# Patient Record
Sex: FEMALE | Race: WHITE | NOT HISPANIC OR LATINO | Employment: OTHER | URBAN - METROPOLITAN AREA
[De-identification: names, ages, dates, MRNs, and addresses within clinical notes are randomized per-mention and may not be internally consistent; named-entity substitution may affect disease eponyms.]

---

## 2017-12-29 ENCOUNTER — HOSPITAL ENCOUNTER (INPATIENT)
Facility: OTHER | Age: 82
LOS: 7 days | Discharge: NURSING, REHAB OR LONG-TERM CARE | DRG: 194 | End: 2018-01-05
Attending: EMERGENCY MEDICINE | Admitting: HOSPITALIST
Payer: MEDICARE

## 2017-12-29 DIAGNOSIS — I10 ESSENTIAL HYPERTENSION: ICD-10-CM

## 2017-12-29 DIAGNOSIS — D53.9 ANEMIA, MACROCYTIC: ICD-10-CM

## 2017-12-29 DIAGNOSIS — N30.00 ACUTE CYSTITIS WITHOUT HEMATURIA: ICD-10-CM

## 2017-12-29 DIAGNOSIS — R06.02 SHORTNESS OF BREATH: ICD-10-CM

## 2017-12-29 DIAGNOSIS — J18.9 COMMUNITY ACQUIRED PNEUMONIA: ICD-10-CM

## 2017-12-29 DIAGNOSIS — J18.9 COMMUNITY ACQUIRED PNEUMONIA, UNSPECIFIED LATERALITY: Primary | ICD-10-CM

## 2017-12-29 DIAGNOSIS — J45.909 REACTIVE AIRWAY DISEASE WITHOUT COMPLICATION, UNSPECIFIED ASTHMA SEVERITY, UNSPECIFIED WHETHER PERSISTENT: ICD-10-CM

## 2017-12-29 DIAGNOSIS — E87.1 HYPONATREMIA: ICD-10-CM

## 2017-12-29 PROBLEM — D69.6 THROMBOCYTOPENIA: Status: ACTIVE | Noted: 2017-12-29

## 2017-12-29 PROBLEM — D64.9 ANEMIA: Status: ACTIVE | Noted: 2017-12-29

## 2017-12-29 LAB
ANION GAP SERPL CALC-SCNC: 14 MMOL/L
BASOPHILS # BLD AUTO: 0.01 K/UL
BASOPHILS NFR BLD: 0.1 %
BNP SERPL-MCNC: 100 PG/ML
BUN SERPL-MCNC: 15 MG/DL
CALCIUM SERPL-MCNC: 8.8 MG/DL
CHLORIDE SERPL-SCNC: 95 MMOL/L
CO2 SERPL-SCNC: 19 MMOL/L
CREAT SERPL-MCNC: 0.8 MG/DL
DIFFERENTIAL METHOD: ABNORMAL
EOSINOPHIL # BLD AUTO: 0 K/UL
EOSINOPHIL NFR BLD: 0 %
ERYTHROCYTE [DISTWIDTH] IN BLOOD BY AUTOMATED COUNT: 15.6 %
EST. GFR  (AFRICAN AMERICAN): >60 ML/MIN/1.73 M^2
EST. GFR  (NON AFRICAN AMERICAN): >60 ML/MIN/1.73 M^2
GLUCOSE SERPL-MCNC: 122 MG/DL
HCT VFR BLD AUTO: 36.4 %
HGB BLD-MCNC: 12 G/DL
LYMPHOCYTES # BLD AUTO: 1.5 K/UL
LYMPHOCYTES NFR BLD: 9.1 %
MCH RBC QN AUTO: 33.1 PG
MCHC RBC AUTO-ENTMCNC: 33 G/DL
MCV RBC AUTO: 100 FL
MONOCYTES # BLD AUTO: 0.8 K/UL
MONOCYTES NFR BLD: 4.8 %
NEUTROPHILS # BLD AUTO: 13.6 K/UL
NEUTROPHILS NFR BLD: 85.2 %
PLATELET # BLD AUTO: 118 K/UL
PMV BLD AUTO: 10.5 FL
POTASSIUM SERPL-SCNC: 3.6 MMOL/L
RBC # BLD AUTO: 3.63 M/UL
SODIUM SERPL-SCNC: 128 MMOL/L
TROPONIN I SERPL DL<=0.01 NG/ML-MCNC: <0.006 NG/ML
WBC # BLD AUTO: 15.94 K/UL

## 2017-12-29 PROCEDURE — 25000003 PHARM REV CODE 250: Performed by: EMERGENCY MEDICINE

## 2017-12-29 PROCEDURE — 96365 THER/PROPH/DIAG IV INF INIT: CPT

## 2017-12-29 PROCEDURE — 25000242 PHARM REV CODE 250 ALT 637 W/ HCPCS: Performed by: PHYSICIAN ASSISTANT

## 2017-12-29 PROCEDURE — 94761 N-INVAS EAR/PLS OXIMETRY MLT: CPT

## 2017-12-29 PROCEDURE — 94640 AIRWAY INHALATION TREATMENT: CPT

## 2017-12-29 PROCEDURE — 99223 1ST HOSP IP/OBS HIGH 75: CPT | Mod: ,,, | Performed by: PHYSICIAN ASSISTANT

## 2017-12-29 PROCEDURE — 63600175 PHARM REV CODE 636 W HCPCS: Performed by: PHYSICIAN ASSISTANT

## 2017-12-29 PROCEDURE — 94644 CONT INHLJ TX 1ST HOUR: CPT

## 2017-12-29 PROCEDURE — 93010 ELECTROCARDIOGRAM REPORT: CPT | Mod: ,,, | Performed by: INTERNAL MEDICINE

## 2017-12-29 PROCEDURE — 99284 EMERGENCY DEPT VISIT MOD MDM: CPT | Mod: 25

## 2017-12-29 PROCEDURE — 25000242 PHARM REV CODE 250 ALT 637 W/ HCPCS: Performed by: EMERGENCY MEDICINE

## 2017-12-29 PROCEDURE — 85025 COMPLETE CBC W/AUTO DIFF WBC: CPT

## 2017-12-29 PROCEDURE — 93005 ELECTROCARDIOGRAM TRACING: CPT

## 2017-12-29 PROCEDURE — 87040 BLOOD CULTURE FOR BACTERIA: CPT

## 2017-12-29 PROCEDURE — 11000001 HC ACUTE MED/SURG PRIVATE ROOM

## 2017-12-29 PROCEDURE — 25000003 PHARM REV CODE 250: Performed by: PHYSICIAN ASSISTANT

## 2017-12-29 PROCEDURE — 80048 BASIC METABOLIC PNL TOTAL CA: CPT

## 2017-12-29 PROCEDURE — 99900035 HC TECH TIME PER 15 MIN (STAT)

## 2017-12-29 PROCEDURE — 83880 ASSAY OF NATRIURETIC PEPTIDE: CPT

## 2017-12-29 PROCEDURE — 84484 ASSAY OF TROPONIN QUANT: CPT

## 2017-12-29 PROCEDURE — 63600175 PHARM REV CODE 636 W HCPCS: Performed by: EMERGENCY MEDICINE

## 2017-12-29 PROCEDURE — 96375 TX/PRO/DX INJ NEW DRUG ADDON: CPT

## 2017-12-29 RX ORDER — HEPARIN SODIUM 5000 [USP'U]/ML
5000 INJECTION, SOLUTION INTRAVENOUS; SUBCUTANEOUS EVERY 8 HOURS
Status: DISCONTINUED | OUTPATIENT
Start: 2017-12-29 | End: 2017-12-30

## 2017-12-29 RX ORDER — ALBUTEROL SULFATE 0.83 MG/ML
15 SOLUTION RESPIRATORY (INHALATION)
Status: COMPLETED | OUTPATIENT
Start: 2017-12-29 | End: 2017-12-29

## 2017-12-29 RX ORDER — AMOXICILLIN 250 MG
1 CAPSULE ORAL 2 TIMES DAILY PRN
Status: DISCONTINUED | OUTPATIENT
Start: 2017-12-29 | End: 2018-01-05 | Stop reason: HOSPADM

## 2017-12-29 RX ORDER — ACETAMINOPHEN 325 MG/1
650 TABLET ORAL EVERY 8 HOURS PRN
Status: DISCONTINUED | OUTPATIENT
Start: 2017-12-29 | End: 2018-01-05 | Stop reason: HOSPADM

## 2017-12-29 RX ORDER — SODIUM CHLORIDE 9 MG/ML
INJECTION, SOLUTION INTRAVENOUS CONTINUOUS
Status: DISCONTINUED | OUTPATIENT
Start: 2017-12-30 | End: 2017-12-30

## 2017-12-29 RX ORDER — SODIUM CHLORIDE 0.9 % (FLUSH) 0.9 %
5 SYRINGE (ML) INJECTION
Status: DISCONTINUED | OUTPATIENT
Start: 2017-12-29 | End: 2018-01-05 | Stop reason: HOSPADM

## 2017-12-29 RX ORDER — DIPHENHYDRAMINE HCL 25 MG
25 CAPSULE ORAL EVERY 6 HOURS PRN
Status: DISCONTINUED | OUTPATIENT
Start: 2017-12-29 | End: 2018-01-05 | Stop reason: HOSPADM

## 2017-12-29 RX ORDER — CEFTRIAXONE 1 G/1
1 INJECTION, POWDER, FOR SOLUTION INTRAMUSCULAR; INTRAVENOUS
Status: COMPLETED | OUTPATIENT
Start: 2017-12-29 | End: 2017-12-29

## 2017-12-29 RX ORDER — ONDANSETRON 2 MG/ML
4 INJECTION INTRAMUSCULAR; INTRAVENOUS EVERY 8 HOURS PRN
Status: DISCONTINUED | OUTPATIENT
Start: 2017-12-29 | End: 2018-01-05 | Stop reason: HOSPADM

## 2017-12-29 RX ORDER — IPRATROPIUM BROMIDE AND ALBUTEROL SULFATE 2.5; .5 MG/3ML; MG/3ML
3 SOLUTION RESPIRATORY (INHALATION)
Status: DISCONTINUED | OUTPATIENT
Start: 2017-12-29 | End: 2018-01-01

## 2017-12-29 RX ADMIN — HEPARIN SODIUM 5000 UNITS: 5000 INJECTION, SOLUTION INTRAVENOUS; SUBCUTANEOUS at 10:12

## 2017-12-29 RX ADMIN — ACETAMINOPHEN 650 MG: 325 TABLET ORAL at 10:12

## 2017-12-29 RX ADMIN — ALBUTEROL SULFATE 15 MG: 2.5 SOLUTION RESPIRATORY (INHALATION) at 11:12

## 2017-12-29 RX ADMIN — DIPHENHYDRAMINE HYDROCHLORIDE 25 MG: 25 CAPSULE ORAL at 10:12

## 2017-12-29 RX ADMIN — AZITHROMYCIN MONOHYDRATE 500 MG: 500 INJECTION, POWDER, LYOPHILIZED, FOR SOLUTION INTRAVENOUS at 11:12

## 2017-12-29 RX ADMIN — IPRATROPIUM BROMIDE AND ALBUTEROL SULFATE 3 ML: .5; 3 SOLUTION RESPIRATORY (INHALATION) at 07:12

## 2017-12-29 RX ADMIN — CEFTRIAXONE SODIUM 1 G: 1 INJECTION, POWDER, FOR SOLUTION INTRAMUSCULAR; INTRAVENOUS at 11:12

## 2017-12-29 RX ADMIN — IPRATROPIUM BROMIDE AND ALBUTEROL SULFATE 3 ML: .5; 3 SOLUTION RESPIRATORY (INHALATION) at 02:12

## 2017-12-29 NOTE — PROGRESS NOTES
Started an hour long nebulizer treatment in ED due to SOB;Pt tolerated it well. Pt was admitted for observation and treatment. Q6 Duoneb treatments were ordered and started. Pt tolerated it well. Will continue to monitor.

## 2017-12-29 NOTE — NURSING
Pt assisted to bathroom by family.  Pt now c/o SOB.  SpO2 95% on room air.  Pt still coarse and wheezing.  Blanche RRT went to bedside.  CARLOS Mcfadden at bedside now with family.  Pt resting comfortably in bed, she has no complaints at this time.  Bedpan brought to pt's room and family educated to use it for now as patient is weak, they verbalized understanding.  Will continue to monitor.

## 2017-12-29 NOTE — ED NOTES
Patient identifiers verified and correct for Radha Connell.    LOC: The patient is awake, alert and aware of environment with an appropriate affect, the patient is oriented x 3 and speaking appropriately.  APPEARANCE: Patient resting comfortably and in no acute distress, patient is clean and well groomed, patient's clothing is properly fastened.  SKIN: The skin is warm and dry, color consistent with ethnicity, patient has normal skin turgor and moist mucus membranes, skin intact, no breakdown or bruising noted.  MUSCULOSKELETAL: Patient moving all extremities spontaneously, no obvious swelling or deformities noted.  RESPIRATORY: Airway is open and patent, respirations are spontaneous, patient has a normal effort and rate, no accessory muscle use noted, bilateral breath sounds expiratory wheezes and coarse, +cough  CARDIAC: Patient has a normal rate and regular rhythm, no periphreal edema noted, capillary refill < 3 seconds.  ABDOMEN: Soft and non tender to palpation, no distention noted.  NEUROLOGIC: Eyes open spontaneously, behavior appropriate to situation, follows commands, facial expression symmetrical, bilateral hand grasp equal and even, purposeful motor response noted, normal sensation in all extremities when touched with a finger.

## 2017-12-29 NOTE — PLAN OF CARE
Problem: Patient Care Overview  Goal: Plan of Care Review  Outcome: Ongoing (interventions implemented as appropriate)  Patient free of fall or injury since arrival to floor.  Denies pain.  SOB controlled with respiratory treatments; still moderately coarse but wheezing has improved.  Positions self independently in bed without difficulty.  VSS on RA.  Family states they will remain at bedside 24/7 and they assist patient with voiding, eating, etc.  Plan of care reviewed extensively with patient and family, all questions answered.  Patient now resting comfortably in bed, has no complaints at this time.  Will continue to monitor.

## 2017-12-29 NOTE — ED NOTES
Pt to ED c/o SOB and cough since last night, no hx of lung dz. EMS reports initial sat of 88% on RA, increased to 98% with DUONEB. Pt reports increased ease of breathing with treatment. Pt denies pain, CP, NVD, fever, chills, congestion. Pt AAOx4 and appropriate at this time. Respirations mildly tachypnic, no distress noted. Awaiting further orders. Pt updated on POC. Bed is locked and in lowest position with side rails up x2. Call bell within reach and pt oriented to use of call bell. Pt on continuous cardiac monitoring, continuous pulse ox, and continuous BP cuff. Allergy and fall risk bands applied. Will continue to monitor.

## 2017-12-29 NOTE — ED NOTES
EMS nebulizer complete, mask removed, Pt O2 sats remains between 95-97% on RA. Wheezes heard in all lung fields.

## 2017-12-29 NOTE — PLAN OF CARE
CM met with pt and family at bedside for initial discharge planning assessment. Pt and family are visiting from [place] for [place]. Pt lives in an assisted living facility in [place] and will return there at time of discharge. Family states likely no CM needs for discharge.     12/29/17 1527   Discharge Assessment   Assessment Type Discharge Planning Assessment   Confirmed/corrected address and phone number on facesheet? Yes   Assessment information obtained from? Caregiver;Medical Record;Patient   Expected Length of Stay (days) 2   Communicated expected length of stay with patient/caregiver yes   Prior to hospitilization cognitive status: Alert/Oriented   Prior to hospitalization functional status: Needs Assistance   Current cognitive status: Alert/Oriented   Current Functional Status: Needs Assistance   Facility Arrived From: home   Lives With other (see comments)  (assis[facility] living facility)   Able to Return to Prior Arrangements yes   Is patient able to care for self after discharge? (pt lives in assissted living facility)   Who are your caregiver(s) and their phone number(s)? Luz [address], daughter,    Patient's perception of discharge disposition assisted living   Readmission Within The Last 30 Days no previous admission in last 30 days   Patient currently being followed by outpatient case management? No   Patient currently receives any other outside agency services? No   Equipment Currently Used at Home walker, rolling   Do you have any problems affording any of your prescribed medications? No   Is the patient taking medications as prescribed? yes   Does the patient have transportation home? Yes   Transportation Available family or friend will provide   Does the patient receive services at the [facility]? No   Discharge Plan A Assisted Living   Discharge Plan B Assisted Living   Patient/Family In Agreement With Plan yes

## 2017-12-29 NOTE — ED PROVIDER NOTES
Encounter Date: 12/29/2017    SCRIBE #1 NOTE: I, Sweetie Hernandez, am scribing for, and in the presence of, Dr. Eller.       History     Chief Complaint   Patient presents with    Shortness of Breath     pt with sob x  one day.      Time seen by provider: 10:31 AM    This is a 89 y.o. female who presents via EMS with complaint of SOB that began today. Per family, paramedics were called this morning when her skin began to turn blue. She reports cough (three days), wheezing (yesterday), and weakness, but denies fever, chills, diaphoresis, chest pain, nausea, vomiting, myalgias, light headedness, dizziness, or headache. She denies having breathing treatments in the past. Per EMS, her saO2 has been 99% while on current breathing treatment.      The history is provided by the patient, a relative and the EMS personnel.     Review of patient's allergies indicates:   Allergen Reactions    Penicillins Rash    Sulfa (sulfonamide antibiotics) Rash     Past Medical History:   Diagnosis Date    Annual physical exam 6/22/2011    Cataract     History of mammogram 6/22/2011    Hypertension     Macular degeneration     Osteoporosis     Posterior vitreous detachment of both eyes     Tachycardia     Thyroid disease     hypothyroidism     Past Surgical History:   Procedure Laterality Date    CARDIAC SURGERY       Family History   Problem Relation Age of Onset    Pneumonia Mother     Aneurysm Father      AAA    Cancer Sister      lung    Hypertension Daughter     Hepatitis Sister      Social History   Substance Use Topics    Smoking status: Never Smoker    Smokeless tobacco: Never Used    Alcohol use No     Review of Systems   Constitutional: Negative for chills, diaphoresis and fever.   Respiratory: Positive for cough, shortness of breath and wheezing.    Cardiovascular: Negative for chest pain.   Gastrointestinal: Negative for nausea and vomiting.   Musculoskeletal: Negative for myalgias.   Neurological:  Positive for weakness. Negative for dizziness, light-headedness and headaches.       Physical Exam     Initial Vitals [12/29/17 1027]   BP Pulse Resp Temp SpO2   -- -- -- -- 98 %      MAP       --         Physical Exam    Nursing note and vitals reviewed.  Constitutional: Vital signs are normal. She appears well-developed and well-nourished. No distress.   HENT:   Head: Normocephalic and atraumatic.   Eyes: Conjunctivae and EOM are normal.   Neck: Normal range of motion. Neck supple.   Cardiovascular: Normal rate, regular rhythm and normal heart sounds. Exam reveals no gallop and no friction rub.    No murmur heard.  Pulmonary/Chest: Accessory muscle usage present. Tachypnea noted. No respiratory distress. She has no rhonchi. She has no rales.   Expiratory wheezes in all lung fields.   Abdominal: Soft. Bowel sounds are normal. There is no tenderness. There is no rebound and no guarding.   Musculoskeletal:   No lower extremity edema.   Neurological: She is alert and oriented to person, place, and time. She has normal strength. No cranial nerve deficit. She displays a negative Romberg sign.   Skin: Skin is warm and dry. No rash noted. No pallor.   Psychiatric: She has a normal mood and affect.         ED Course   Critical Care  Date/Time: 12/29/2017 11:33 AM  Performed by: LIN ESPAÑA.  Authorized by: LIN ESPAÑA.   Direct patient critical care time: 15 minutes  Additional history critical care time: 5 minutes  Ordering / reviewing critical care time: 5 minutes  Documentation critical care time: 5 minutes  Consulting other physicians critical care time: 5 minutes  Consult with family critical care time: 8 minutes  Total critical care time (exclusive of procedural time) : 43 minutes        Labs Reviewed   CBC W/ AUTO DIFFERENTIAL - Abnormal; Notable for the following:        Result Value    WBC 15.94 (*)     RBC 3.63 (*)     Hematocrit 36.4 (*)      (*)     MCH 33.1 (*)     RDW 15.6 (*)      Platelets 118 (*)     Gran # 13.6 (*)     Gran% 85.2 (*)     Lymph% 9.1 (*)     All other components within normal limits   BASIC METABOLIC PANEL - Abnormal; Notable for the following:     Sodium 128 (*)     CO2 19 (*)     Glucose 122 (*)     All other components within normal limits   B-TYPE NATRIURETIC PEPTIDE - Abnormal; Notable for the following:      (*)     All other components within normal limits   CULTURE, BLOOD   CULTURE, BLOOD   TROPONIN I     Imaging Results          X-Ray Chest AP Portable (Final result)  Result time 12/29/17 11:07:17    Final result by Gage Crocker MD (12/29/17 11:07:17)                 Impression:     Bibasilar opacities, likely atelectasis with possible tiny left effusion.      Electronically signed by: Dr. Gage Crocker  Date:     12/29/17  Time:    11:07              Narrative:    Portable chest    Comparison: June 22, 2001    Findings: The cardiac silhouette is upper limits of normal. Mediastinum unremarkable. Lungs demonstrate bibasilar opacity with suspected small left-sided effusion. Osseous structures unchanged.                              EKG Readings: (Independently Interpreted)   Rhythm: Normal Sinus Rhythm. Heart Rate: 88.   Normal intervals. Narrow QRS segment. No acute ST/T wave abnormalities. No change from previous EKG on 11/13/12.       X-Rays:   Independently Interpreted Readings:   Chest X-Ray: Normal heart size. Increased perihilar markings. No pleural effusion. No bony abnormalities.      Medical Decision Making:   Clinical Tests:   Lab Tests: Ordered and Reviewed  Radiological Study: Ordered and Reviewed  Medical Tests: Ordered and Reviewed  ED Management:  89-year-old female presents with shortness of breath.  Based on history sounds suspicion of respiratory infection causing reactive airway disease.  We'll get labs as well as a chest x-ray to evaluate for pneumonia.  We'll continue on breathing treatments.  We'll get an EKG and cardiac  enzymes to evaluate for acute coronary syndrome.    11:15 AM chest x-ray is concerning of pneumonia.  Shows bilateral opacities.  We'll treat for pneumonia.  I do not feel that the patient is septic at this time.  We'll give antibiotics.  On reevaluation patient still has some wheezing and his tachypnea.  Saturations approximately 95%.  We will give her continuous albuterol treatment.  I do feel the patient needs to be admitted for further evaluation and management.  I've explained this to the patient as well as her son and daughter were both at bedside.    11:34 AM called hospital medicine and will admit. Updated family on diagnosis.              Attending Attestation:           Physician Attestation for Scribe:  Physician Attestation Statement for Scribe #1: I, Dr. Eller, reviewed documentation, as scribed by Sweetie Hernandez in my presence, and it is both accurate and complete.                 ED Course      Clinical Impression:     1. Community acquired pneumonia, unspecified laterality    2. Shortness of breath    3. Reactive airway disease without complication, unspecified asthma severity, unspecified whether persistent                                 Jeevan Eller, DO  12/29/17 5520

## 2017-12-29 NOTE — PLAN OF CARE
12/29/17 1139   ED Admissions Case Approval   ED Admissions Case Approval CM Approved  (Observation)

## 2017-12-30 PROBLEM — N30.00 ACUTE CYSTITIS WITHOUT HEMATURIA: Status: ACTIVE | Noted: 2017-12-30

## 2017-12-30 LAB
ALBUMIN SERPL BCP-MCNC: 2.9 G/DL
ALP SERPL-CCNC: 59 U/L
ALT SERPL W/O P-5'-P-CCNC: 18 U/L
ANION GAP SERPL CALC-SCNC: 10 MMOL/L
AST SERPL-CCNC: 26 U/L
BACTERIA #/AREA URNS HPF: ABNORMAL /HPF
BILIRUB SERPL-MCNC: 0.4 MG/DL
BILIRUB UR QL STRIP: NEGATIVE
BUN SERPL-MCNC: 17 MG/DL
CALCIUM SERPL-MCNC: 8.3 MG/DL
CHLORIDE SERPL-SCNC: 94 MMOL/L
CLARITY UR: CLEAR
CO2 SERPL-SCNC: 21 MMOL/L
COLOR UR: YELLOW
CREAT SERPL-MCNC: 0.8 MG/DL
EST. GFR  (AFRICAN AMERICAN): >60 ML/MIN/1.73 M^2
EST. GFR  (NON AFRICAN AMERICAN): >60 ML/MIN/1.73 M^2
FERRITIN SERPL-MCNC: 1515 NG/ML
FLUAV AG SPEC QL IA: NEGATIVE
FLUBV AG SPEC QL IA: NEGATIVE
FOLATE SERPL-MCNC: 18 NG/ML
GLUCOSE SERPL-MCNC: 91 MG/DL
GLUCOSE UR QL STRIP: NEGATIVE
HGB UR QL STRIP: ABNORMAL
IRON SERPL-MCNC: 27 UG/DL
KETONES UR QL STRIP: NEGATIVE
LEUKOCYTE ESTERASE UR QL STRIP: ABNORMAL
MAGNESIUM SERPL-MCNC: 1.8 MG/DL
MICROSCOPIC COMMENT: ABNORMAL
NITRITE UR QL STRIP: NEGATIVE
NON-SQ EPI CELLS #/AREA URNS HPF: 4 /HPF
PH UR STRIP: 6 [PH] (ref 5–8)
PHOSPHATE SERPL-MCNC: 3.3 MG/DL
POTASSIUM SERPL-SCNC: 3.5 MMOL/L
PROT SERPL-MCNC: 6 G/DL
PROT UR QL STRIP: ABNORMAL
RBC #/AREA URNS HPF: 2 /HPF (ref 0–4)
SATURATED IRON: 12 %
SODIUM SERPL-SCNC: 125 MMOL/L
SODIUM UR-SCNC: <20 MMOL/L
SP GR UR STRIP: 1.01 (ref 1–1.03)
SPECIMEN SOURCE: NORMAL
SQUAMOUS #/AREA URNS HPF: 1 /HPF
TOTAL IRON BINDING CAPACITY: 234 UG/DL
TRANSFERRIN SERPL-MCNC: 158 MG/DL
URN SPEC COLLECT METH UR: ABNORMAL
UROBILINOGEN UR STRIP-ACNC: NEGATIVE EU/DL
VIT B12 SERPL-MCNC: >2000 PG/ML
WBC #/AREA URNS HPF: 40 /HPF (ref 0–5)

## 2017-12-30 PROCEDURE — 25000003 PHARM REV CODE 250: Performed by: PHYSICIAN ASSISTANT

## 2017-12-30 PROCEDURE — 25000003 PHARM REV CODE 250: Performed by: HOSPITALIST

## 2017-12-30 PROCEDURE — 80053 COMPREHEN METABOLIC PANEL: CPT

## 2017-12-30 PROCEDURE — 84100 ASSAY OF PHOSPHORUS: CPT

## 2017-12-30 PROCEDURE — 87070 CULTURE OTHR SPECIMN AEROBIC: CPT

## 2017-12-30 PROCEDURE — 97530 THERAPEUTIC ACTIVITIES: CPT

## 2017-12-30 PROCEDURE — 11000001 HC ACUTE MED/SURG PRIVATE ROOM

## 2017-12-30 PROCEDURE — 82746 ASSAY OF FOLIC ACID SERUM: CPT

## 2017-12-30 PROCEDURE — 94640 AIRWAY INHALATION TREATMENT: CPT

## 2017-12-30 PROCEDURE — 84300 ASSAY OF URINE SODIUM: CPT

## 2017-12-30 PROCEDURE — 94761 N-INVAS EAR/PLS OXIMETRY MLT: CPT

## 2017-12-30 PROCEDURE — 99231 SBSQ HOSP IP/OBS SF/LOW 25: CPT | Mod: ,,, | Performed by: HOSPITALIST

## 2017-12-30 PROCEDURE — 94664 DEMO&/EVAL PT USE INHALER: CPT

## 2017-12-30 PROCEDURE — 81000 URINALYSIS NONAUTO W/SCOPE: CPT

## 2017-12-30 PROCEDURE — 25000242 PHARM REV CODE 250 ALT 637 W/ HCPCS: Performed by: PHYSICIAN ASSISTANT

## 2017-12-30 PROCEDURE — 87205 SMEAR GRAM STAIN: CPT

## 2017-12-30 PROCEDURE — 63600175 PHARM REV CODE 636 W HCPCS: Performed by: PHYSICIAN ASSISTANT

## 2017-12-30 PROCEDURE — 36415 COLL VENOUS BLD VENIPUNCTURE: CPT

## 2017-12-30 PROCEDURE — 63600175 PHARM REV CODE 636 W HCPCS: Performed by: HOSPITALIST

## 2017-12-30 PROCEDURE — 27000173 HC ACAPELLA DEVICE DH OR DM

## 2017-12-30 PROCEDURE — 97161 PT EVAL LOW COMPLEX 20 MIN: CPT

## 2017-12-30 PROCEDURE — 83735 ASSAY OF MAGNESIUM: CPT

## 2017-12-30 PROCEDURE — 83540 ASSAY OF IRON: CPT

## 2017-12-30 PROCEDURE — 87086 URINE CULTURE/COLONY COUNT: CPT

## 2017-12-30 PROCEDURE — 82728 ASSAY OF FERRITIN: CPT

## 2017-12-30 PROCEDURE — 99900035 HC TECH TIME PER 15 MIN (STAT)

## 2017-12-30 PROCEDURE — 82607 VITAMIN B-12: CPT

## 2017-12-30 PROCEDURE — 87400 INFLUENZA A/B EACH AG IA: CPT | Mod: 59

## 2017-12-30 RX ORDER — RAMELTEON 8 MG/1
8 TABLET ORAL NIGHTLY
Status: COMPLETED | OUTPATIENT
Start: 2017-12-30 | End: 2017-12-31

## 2017-12-30 RX ORDER — GUAIFENESIN/DEXTROMETHORPHAN 100-10MG/5
10 SYRUP ORAL EVERY 6 HOURS
Status: DISPENSED | OUTPATIENT
Start: 2017-12-30 | End: 2018-01-01

## 2017-12-30 RX ORDER — AZITHROMYCIN 250 MG/1
250 TABLET, FILM COATED ORAL DAILY
Status: DISCONTINUED | OUTPATIENT
Start: 2017-12-30 | End: 2018-01-02

## 2017-12-30 RX ORDER — BENAZEPRIL HYDROCHLORIDE 5 MG/1
5 TABLET ORAL DAILY
Status: DISCONTINUED | OUTPATIENT
Start: 2017-12-30 | End: 2018-01-04

## 2017-12-30 RX ORDER — AZITHROMYCIN 250 MG/1
250 TABLET, FILM COATED ORAL DAILY
Status: DISCONTINUED | OUTPATIENT
Start: 2017-12-30 | End: 2017-12-30

## 2017-12-30 RX ORDER — ENOXAPARIN SODIUM 100 MG/ML
30 INJECTION SUBCUTANEOUS EVERY 24 HOURS
Status: DISCONTINUED | OUTPATIENT
Start: 2017-12-30 | End: 2018-01-05 | Stop reason: HOSPADM

## 2017-12-30 RX ORDER — VERAPAMIL HYDROCHLORIDE 120 MG/1
240 TABLET, FILM COATED, EXTENDED RELEASE ORAL DAILY
Status: DISCONTINUED | OUTPATIENT
Start: 2017-12-30 | End: 2018-01-04

## 2017-12-30 RX ORDER — PREDNISONE 20 MG/1
20 TABLET ORAL DAILY
Status: DISCONTINUED | OUTPATIENT
Start: 2017-12-30 | End: 2018-01-05 | Stop reason: HOSPADM

## 2017-12-30 RX ORDER — RAMELTEON 8 MG/1
8 TABLET ORAL NIGHTLY PRN
Status: DISCONTINUED | OUTPATIENT
Start: 2017-12-30 | End: 2017-12-30

## 2017-12-30 RX ADMIN — IPRATROPIUM BROMIDE AND ALBUTEROL SULFATE 3 ML: .5; 3 SOLUTION RESPIRATORY (INHALATION) at 01:12

## 2017-12-30 RX ADMIN — HEPARIN SODIUM 5000 UNITS: 5000 INJECTION, SOLUTION INTRAVENOUS; SUBCUTANEOUS at 01:12

## 2017-12-30 RX ADMIN — VERAPAMIL HYDROCHLORIDE 240 MG: 120 TABLET, FILM COATED, EXTENDED RELEASE ORAL at 06:12

## 2017-12-30 RX ADMIN — AZITHROMYCIN 250 MG: 250 TABLET, FILM COATED ORAL at 01:12

## 2017-12-30 RX ADMIN — HEPARIN SODIUM 5000 UNITS: 5000 INJECTION, SOLUTION INTRAVENOUS; SUBCUTANEOUS at 06:12

## 2017-12-30 RX ADMIN — SODIUM CHLORIDE: 0.9 INJECTION, SOLUTION INTRAVENOUS at 04:12

## 2017-12-30 RX ADMIN — IPRATROPIUM BROMIDE AND ALBUTEROL SULFATE 3 ML: .5; 3 SOLUTION RESPIRATORY (INHALATION) at 07:12

## 2017-12-30 RX ADMIN — METHYLPREDNISOLONE SODIUM SUCCINATE 40 MG: 40 INJECTION, POWDER, FOR SOLUTION INTRAMUSCULAR; INTRAVENOUS at 01:12

## 2017-12-30 RX ADMIN — PREDNISONE 20 MG: 20 TABLET ORAL at 01:12

## 2017-12-30 RX ADMIN — RAMELTEON 8 MG: 8 TABLET, FILM COATED ORAL at 09:12

## 2017-12-30 RX ADMIN — BENAZEPRIL HYDROCHLORIDE 5 MG: 5 TABLET, COATED ORAL at 06:12

## 2017-12-30 RX ADMIN — GUAIFENESIN AND DEXTROMETHORPHAN 10 ML: 100; 10 SYRUP ORAL at 01:12

## 2017-12-30 RX ADMIN — CEFTRIAXONE 1 G: 1 INJECTION, SOLUTION INTRAVENOUS at 12:12

## 2017-12-30 RX ADMIN — GUAIFENESIN AND DEXTROMETHORPHAN 10 ML: 100; 10 SYRUP ORAL at 06:12

## 2017-12-30 NOTE — SUBJECTIVE & OBJECTIVE
Interval History:   Pt new to me.  Feeling better, but still poor with wheezing.        Review of Systems   Constitutional: Negative for diaphoresis and fever.   Eyes: Negative for discharge and visual disturbance.   Respiratory: Positive for wheezing. Negative for cough and shortness of breath.    Cardiovascular: Negative for chest pain and palpitations.   Gastrointestinal: Negative for abdominal pain and nausea.   Genitourinary: Negative for difficulty urinating and dysuria.   Musculoskeletal: Negative for arthralgias and myalgias.   Skin: Negative for color change and rash.   Neurological: Negative for dizziness and numbness.   Psychiatric/Behavioral: Negative for agitation and confusion.     Objective:     Vital Signs (Most Recent):  Temp: 98.5 °F (36.9 °C) (12/30/17 0803)  Pulse: 80 (12/30/17 1315)  Resp: 18 (12/30/17 1315)  BP: 120/63 (12/30/17 0803)  SpO2: 96 % (12/30/17 1315) Vital Signs (24h Range):  Temp:  [98.1 °F (36.7 °C)-98.5 °F (36.9 °C)] 98.5 °F (36.9 °C)  Pulse:  [62-96] 80  Resp:  [16-20] 18  SpO2:  [92 %-100 %] 96 %  BP: (107-135)/(55-65) 120/63     Weight: 59 kg (130 lb)  Body mass index is 25.39 kg/m².    Intake/Output Summary (Last 24 hours) at 12/30/17 1422  Last data filed at 12/30/17 0645   Gross per 24 hour   Intake           421.25 ml   Output              405 ml   Net            16.25 ml      Physical Exam   Constitutional: She appears well-developed and well-nourished.   HENT:   Head: Normocephalic and atraumatic.   Eyes: Conjunctivae are normal. Pupils are equal, round, and reactive to light.   Neck: Normal range of motion. Neck supple.   Cardiovascular: Regular rhythm and normal heart sounds.    Pulmonary/Chest: Effort normal. She has wheezes.   Abdominal: Soft. Bowel sounds are normal. There is no tenderness.   Musculoskeletal: She exhibits no edema or tenderness.   Neurological: She is alert.   Responds appropriately   Skin: Skin is warm and dry.       Significant Labs:   CBC:    Recent Labs  Lab 12/29/17  1045   WBC 15.94*   HGB 12.0   HCT 36.4*   *     CMP:   Recent Labs  Lab 12/29/17  1045 12/30/17  0358   * 125*   K 3.6 3.5   CL 95 94*   CO2 19* 21*   * 91   BUN 15 17   CREATININE 0.8 0.8   CALCIUM 8.8 8.3*   PROT  --  6.0   ALBUMIN  --  2.9*   BILITOT  --  0.4   ALKPHOS  --  59   AST  --  26   ALT  --  18   ANIONGAP 14 10   EGFRNONAA >60 >60       Significant Imaging: I have reviewed all pertinent imaging results/findings within the past 24 hours.   Imaging Results          X-Ray Chest AP Portable (Final result)  Result time 12/29/17 11:07:17    Final result by Gage Crocker MD (12/29/17 11:07:17)                 Impression:     Bibasilar opacities, likely atelectasis with possible tiny left effusion.      Electronically signed by: Dr. Gage Crocker  Date:     12/29/17  Time:    11:07              Narrative:    Portable chest    Comparison: June 22, 2001    Findings: The cardiac silhouette is upper limits of normal. Mediastinum unremarkable. Lungs demonstrate bibasilar opacity with suspected small left-sided effusion. Osseous structures unchanged.

## 2017-12-30 NOTE — PLAN OF CARE
Problem: Patient Care Overview  Goal: Plan of Care Review  Outcome: Ongoing (interventions implemented as appropriate)  Patient on room air; aerosol treatments given as scheduled with no complications. Patient unable to perform acapella, at bedside.

## 2017-12-30 NOTE — PROGRESS NOTES
[facility] Hospital Medicine  Progress Note    Patient Name: Radha Connell  MRN: [id number]  Patient Class: IP- Inpatient   Admission Date: 12/29/2017  Length of Stay: 1 days  Attending Physician: Anthony Ramires MD  Primary Care Provider: Ivelisse Cerrato MD        Subjective:     Principal Problem:Community acquired pneumonia    HPI:  89 year old female presents to ED with c/o SOB that started today associated with cough. Per family members patient has been feeling more fatigued, weak for the last 3 days with poor appetite. Denies fever, chills, N/V/D, dysuria, urinary frequency. Hx of HTN, non-smoker.     ED evaluation showed WBC 15.94, Na 128; CXR with bibasilar opacities    Admitted for further evaluation and treatment    Hospital Course:  No notes on file    Interval History:   Pt new to me.  Feeling better, but still poor with wheezing.        Review of Systems   Constitutional: Negative for diaphoresis and fever.   Eyes: Negative for discharge and visual disturbance.   Respiratory: Positive for shortness of breath and wheezing. Negative for cough.    Cardiovascular: Negative for chest pain and palpitations.   Gastrointestinal: Negative for abdominal pain and nausea.   Genitourinary: Negative for difficulty urinating and dysuria.   Musculoskeletal: Negative for arthralgias and myalgias.   Skin: Negative for color change and rash.   Neurological: Negative for dizziness and numbness.   Psychiatric/Behavioral: Negative for agitation and confusion.     Objective:     Vital Signs (Most Recent):  Temp: 98.5 °F (36.9 °C) (12/30/17 0803)  Pulse: 80 (12/30/17 1315)  Resp: 18 (12/30/17 1315)  BP: 120/63 (12/30/17 0803)  SpO2: 96 % (12/30/17 1315) Vital Signs (24h Range):  Temp:  [98.1 °F (36.7 °C)-98.5 °F (36.9 °C)] 98.5 °F (36.9 °C)  Pulse:  [62-96] 80  Resp:  [16-20] 18  SpO2:  [92 %-100 %] 96 %  BP: (107-135)/(55-65) 120/63     Weight: 59 kg (130 lb)  Body mass index is 25.39 kg/m².    Intake/Output  Summary (Last 24 hours) at 12/30/17 1422  Last data filed at 12/30/17 0645   Gross per 24 hour   Intake           421.25 ml   Output              405 ml   Net            16.25 ml      Physical Exam   Constitutional: She appears well-developed and well-nourished.   HENT:   Head: Normocephalic and atraumatic.   Eyes: Conjunctivae are normal. Pupils are equal, round, and reactive to light.   Neck: Normal range of motion. Neck supple.   Cardiovascular: Regular rhythm and normal heart sounds.    Pulmonary/Chest: Effort normal. She has wheezes.   Abdominal: Soft. Bowel sounds are normal. There is no tenderness.   Musculoskeletal: She exhibits no edema or tenderness.   Neurological: She is alert.   Responds appropriately   Skin: Skin is warm and dry.       Significant Labs:   CBC:   Recent Labs  Lab 12/29/17  1045   WBC 15.94*   HGB 12.0   HCT 36.4*   *     CMP:   Recent Labs  Lab 12/29/17  1045 12/30/17  0358   * 125*   K 3.6 3.5   CL 95 94*   CO2 19* 21*   * 91   BUN 15 17   CREATININE 0.8 0.8   CALCIUM 8.8 8.3*   PROT  --  6.0   ALBUMIN  --  2.9*   BILITOT  --  0.4   ALKPHOS  --  59   AST  --  26   ALT  --  18   ANIONGAP 14 10   EGFRNONAA >60 >60       Significant Imaging: I have reviewed all pertinent imaging results/findings within the past 24 hours.   Imaging Results          X-Ray Chest AP Portable (Final result)  Result time 12/29/17 11:07:17    Final result by Gage Crocker MD (12/29/17 11:07:17)                 Impression:     Bibasilar opacities, likely atelectasis with possible tiny left effusion.      Electronically signed by: Dr. Gage Crocker  Date:     12/29/17  Time:    11:07              Narrative:    Portable chest    Comparison: June 22, 2001    Findings: The cardiac silhouette is upper limits of normal. Mediastinum unremarkable. Lungs demonstrate bibasilar opacity with suspected small left-sided effusion. Osseous structures unchanged.                             Assessment/Plan:      * Community acquired pneumonia    - leukocytosis with bibasilar opacities on CXR, concern for PNA  - will continue Ceftriaxone (tolerated in ED) and Zithromax day 2  - bronchodilators/acapella/respiratory culture            Acute cystitis without hematuria    - Urine culture pending.  - Rocephin day 2            Anemia    - macrocytic anemia  - Fe, B-12, folate not low.             Thrombocytopenia    - no evidence of bleeding  - monitor          Hyponatremia    - Hx chronic hyponatremia, could be related to PNA vs volume depletion from poor po  - DC IVF  - Check urine sodium < 20.  - Monitor              Hypertension    - Resume meds.  - Monitor.          VTE Risk Mitigation         Ordered     enoxaparin injection 30 mg  Daily     Route:  Subcutaneous        12/30/17 1428     Medium Risk of VTE  Once      12/29/17 1344     Place sequential compression device  Until discontinued      12/29/17 1344     Place BURTON hose  Until discontinued      12/29/17 1344              Anthony Ramires MD  Department of Hospital Medicine   [facility], [place]

## 2017-12-30 NOTE — ASSESSMENT & PLAN NOTE
- Hx chronic hyponatremia, could be related to PNA vs volume depletion from poor po  - DC IVF  - Minimize antibiotics in D5.  - Check urine sodium < 20.  - Monitor

## 2017-12-30 NOTE — H&P
[facility] Hospital Medicine  History & Physical    Patient Name: Radha Connell  MRN: [id number]  Admission Date: 12/29/2017  Attending Physician: Anthony Ramires MD   Primary Care Provider: Ivelisse Cerrato MD         Patient information was obtained from patient, relative(s) and ER records.     Subjective:     Principal Problem:Community acquired pneumonia    Chief Complaint:   Chief Complaint   Patient presents with    Shortness of Breath     pt with sob x  one day.         HPI: 89 year old female presents to ED with c/o SOB that started today associated with cough. Per family members patient has been feeling more fatigued, weak for the last 3 days with poor appetite. Denies fever, chills, N/V/D, dysuria, urinary frequency. Hx of HTN, non-smoker.     ED evaluation showed WBC 15.94, Na 128; CXR with bibasilar opacities    Admitted for further evaluation and treatment    Past Medical History:   Diagnosis Date    Annual physical exam 6/22/2011    Cataract     History of mammogram 6/22/2011    Hypertension     Macular degeneration     Osteoporosis     Posterior vitreous detachment of both eyes     Tachycardia     Thyroid disease     hypothyroidism       Past Surgical History:   Procedure Laterality Date    CARDIAC SURGERY         Review of patient's allergies indicates:   Allergen Reactions    Penicillins Rash    Sulfa (sulfonamide antibiotics) Rash       No current facility-administered medications on file prior to encounter.      Current Outpatient Prescriptions on File Prior to Encounter   Medication Sig    NON FORMULARY MEDICATION 2000 vit D  One iron  Calcium    trandolapril (MAVIK) 2 MG Tab Take 1 tablet (2 mg total) by mouth once daily.    verapamil (CALAN-SR) 240 MG CR tablet TAKE 1 TABLET BY MOUTH EVERY DAY    alendronate (FOSAMAX) 70 MG tablet Take 1 tablet (70 mg total) by mouth every 7 days.     Family History     Problem Relation (Age of Onset)    Aneurysm Father    Cancer  Sister    Hepatitis Sister    Hypertension Daughter    Pneumonia Mother        Social History Main Topics    Smoking status: Never Smoker    Smokeless tobacco: Never Used    Alcohol use No    Drug use: No    Sexual activity: No     Review of Systems   Constitutional: Positive for activity change, appetite change and fatigue. Negative for fever.   HENT: Negative for congestion, rhinorrhea and sore throat.    Respiratory: Positive for cough and shortness of breath.    Cardiovascular: Negative for chest pain and leg swelling.   Gastrointestinal: Negative for abdominal distention, abdominal pain, diarrhea, nausea and vomiting.   Genitourinary: Negative for dysuria and frequency.   Musculoskeletal: Negative for back pain and neck pain.   Skin: Negative for color change.   Neurological: Negative for dizziness, syncope, light-headedness and numbness.   Psychiatric/Behavioral: Negative for agitation.     Objective:     Vital Signs (Most Recent):  Temp: 98.5 °F (36.9 °C) (12/29/17 2014)  Pulse: 87 (12/29/17 2014)  Resp: 16 (12/29/17 2014)  BP: 111/64 (12/29/17 2014)  SpO2: 95 % (12/29/17 2014) Vital Signs (24h Range):  Temp:  [97.9 °F (36.6 °C)-98.5 °F (36.9 °C)] 98.5 °F (36.9 °C)  Pulse:  [] 87  Resp:  [16-22] 16  SpO2:  [95 %-100 %] 95 %  BP: ()/(51-77) 111/64     Weight: 59 kg (130 lb)  Body mass index is 25.39 kg/m².    Physical Exam   Constitutional: She appears well-developed and well-nourished. No distress.   Elderly female in no distress   HENT:   Head: Normocephalic.   Mouth/Throat: Oropharynx is clear and moist.   Eyes: Conjunctivae are normal. Right eye exhibits no discharge. Left eye exhibits no discharge. No scleral icterus.   Neck: Normal range of motion. Neck supple.   Cardiovascular: Normal rate, regular rhythm and intact distal pulses.    Pulmonary/Chest: No respiratory distress. She has no rales.   Diminished bases b/l, exp wheezes, coarse b/s   Abdominal: Soft. Bowel sounds are normal. She  exhibits no distension. There is no tenderness.   Musculoskeletal: Normal range of motion. She exhibits no edema or tenderness.   Neurological: She is alert.   Grossly non-focal   Skin: Skin is warm and dry. She is not diaphoretic.   Psychiatric: She has a normal mood and affect.   Nursing note and vitals reviewed.          Significant Labs:   CBC:   Recent Labs  Lab 12/29/17  1045   WBC 15.94*   HGB 12.0   HCT 36.4*   *     CMP:   Recent Labs  Lab 12/29/17  1045   *   K 3.6   CL 95   CO2 19*   *   BUN 15   CREATININE 0.8   CALCIUM 8.8   ANIONGAP 14   EGFRNONAA >60     Cardiac Markers:   Recent Labs  Lab 12/29/17  1045   *       Significant Imaging: I have reviewed all pertinent imaging results/findings within the past 24 hours.   Imaging Results          X-Ray Chest AP Portable (Final result)  Result time 12/29/17 11:07:17    Final result by Gage Crocker MD (12/29/17 11:07:17)                 Impression:     Bibasilar opacities, likely atelectasis with possible tiny left effusion.      Electronically signed by: Dr. Gage rCocker  Date:     12/29/17  Time:    11:07              Narrative:    Portable chest    Comparison: June 22, 2001    Findings: The cardiac silhouette is upper limits of normal. Mediastinum unremarkable. Lungs demonstrate bibasilar opacity with suspected small left-sided effusion. Osseous structures unchanged.                                Assessment/Plan:     * Community acquired pneumonia    - leukocytosis with bibasilar opacities on CXR, concern for PNA  - will continue Ceftriaxone (tolerated in ED) and Zithromax  - bronchodilators/acapella/respiratory culture  - check UA/UC          Anemia    - macrocytic anemia  - Hx of Fe def  - check anemia studies          Thrombocytopenia    - no evidence of bleeding  - monitor          Hyponatremia    - Hx chronic hyponatremia, could be related to PNA vs volume depletion from poor po  - gentle hydration           Hypertension    - BP on the low side, hold BP meds  - monitor          VTE Risk Mitigation         Ordered     heparin (porcine) injection 5,000 Units  Every 8 hours     Route:  Subcutaneous        12/29/17 1344     Place sequential compression device  Until discontinued      12/29/17 1344     Medium Risk of VTE  Once      12/29/17 1344     Place sequential compression device  Until discontinued      12/29/17 1344     Place BURTON hose  Until discontinued      12/29/17 1344             Lesa Tinajero PA-C  Department of Hospital Medicine   [facility]

## 2017-12-30 NOTE — PT/OT/SLP EVAL
"Physical Therapy Evaluation and Treatment    Patient Name:  Radha Connell   MRN:  [id number]    Recommendations:     Discharge Recommendations:  assisted living facility (return to assisted living facility with continued PT in assisted living (in-house PT available? versus home health PT))   Discharge Equipment Recommendations: none   Barriers to discharge: None with return to assisted living facility    Assessment:     Radha Connell is a 89 y.o. female admitted with a medical diagnosis of Community acquired pneumonia.  She presents with the following impairments/functional limitations:  impaired endurance, impaired self care skills, impaired functional mobilty, impaired balance. PT evaluation completed. Pt requires CGA for gait in halls > 200 ft with rolling walker. SpO2 on room air 97% at rest, 93% with activity, recovery to 97% in sitting. Pt ok for OOB to chair, bathroom, and ambulation in halls with nurse assist and use of walker. Will continue to follow and progress as tolerated.    Rehab Prognosis:  Good; patient would benefit from acute skilled PT services to address these deficits and reach maximum level of function.      Recent Surgery: * No surgery found *      Plan:     During this hospitalization, patient to be seen 5 x/week to address the above listed problems via gait training, therapeutic activities, therapeutic exercises, neuromuscular re-education  · Plan of Care Expires:  01/29/18   Plan of Care Reviewed with: patient, daughter    Subjective     Communicated with nurse prior to session.  Patient found R side lying in bed, daughter in law present, daughter arrived during session upon PT entry to room, agreeable to evaluation.      Chief Complaint: "I've been in this bed for a week."  Patient comments/goals: walk, strengthening  Pain/Comfort:  · Pain Rating 1: 0/10  · Pain Rating Post-Intervention 1: 0/10    Patients cultural, spiritual, Worship conflicts given the current situation: none " specified    Living Environment:  Pt lives on first floor of assisted living facility in [place] (she is visiting family in [facility] for the holidays). [facility] is on the second floor with elevator access, although pt prefers to take the stairs. She has a walk-in shower with built in shower chair.   Prior to admission, patients level of function was modified independent for ambulation with rollator. Modified independent for seated bathing on shower chair. Independent with toileting, dressing, feeding. Staff in assisted living assist with cleaning her apartment and meal preparation. Assisted living facility hosts activities that the patient enjoys participating in.  Patient has the following equipment: shower chair, rollator.  DME owned (not currently used): none.  Upon discharge, patient will have assistance from staff at assisted living facility in [place]. From [facility], pt will d/c home with family. Daughter in law lives locally, daughter is here visiting with patient from [place].    Objective:     Patient found with: peripheral IV     General Precautions: Standard, fall (ambulate with assist)   Orthopedic Precautions:N/A   Braces: N/A     Exams:  · Cognition: Patient is oriented to Person and follows approximately 100% of one step commands.    · Posture:    · -       Rounded shoulders  · -       Forward head  · Sensation: Intact to light touch bilateral LEs. Denied paresthesias  · Skin Integrity: Visible skin intact and Thin  · Edema: None noted   · Coordination: No coordination impairments identified with functional mobility. No formal testing performed.   · LE ROM/Strength: 5/5 bilateral ankle dorsiflexion and knee extension  · Tone: No tone impairments identified during functional mobility.   · Vital signs: SpO2: 97% on room air; Heart rate 80 bpm at rest.  · Coarse breath sounds noted, wet productive cough during session.      Functional Mobility:  · Bed Mobility:     · Supine to Sit: supervision and bed  flat  · Transfers: SBA for sit<>stand without device    · Gait: x 220 ft on level tile with rolling walker and CGA. Verbal cues for upright posture and standing rest breaks due to SOB. Monitoring of SpO2 during gait.  · Balance: SBA for dynamic stand with bilateral UE support    AM-PAC 6 CLICK MOBILITY  Total Score:20       Therapeutic Activities and Exercises:  SpO2 on room air 97% at rest, 93% with activity, recovery to 97% in sitting.   Discussed PT plan of care, safety with OOB mobility, use of walker, walking program with nursing or family 2-3x/daily.      Patient left up in chair with all lines intact, call button in reach, nurse notified and daughter and daughter in law present.    GOALS:    Physical Therapy Goals        Problem: Physical Therapy Goal    Goal Priority Disciplines Outcome Goal Variances Interventions   Physical Therapy Goal     PT/OT, PT Ongoing (interventions implemented as appropriate)     Description:  Goals to be met by: 1/10/18     Patient will increase functional independence with mobility by performin. Sit<>stand transfer with supervision using rolling walker.   2. Gait > 150 feet with supervision using rolling walker.   3. Ascend/descend full flight stairs with unilateral handrails with CGA with or without AD.                      History:     Past Medical History:   Diagnosis Date    Annual physical exam 2011    Cataract     History of mammogram 2011    Hypertension     Macular degeneration     Osteoporosis     Posterior vitreous detachment of both eyes     Tachycardia     Thyroid disease     hypothyroidism       Past Surgical History:   Procedure Laterality Date    CARDIAC SURGERY         Clinical Decision Making:     History  Co-morbidities and personal factors that may impact the plan of care Examination  Body Structures and Functions, activity limitations and participation restrictions that may impact the plan of care Clinical Presentation   Decision  Making/ Complexity Score   Co-morbidities:   [] Time since onset of injury / illness / exacerbation  [] Status of current condition  []Patient's cognitive status and safety concerns    [] Multiple Medical Problems (see med hx)  Personal Factors:   [] Patient's age  [] Prior Level of function   [] Patient's home situation (environment and family support)  [] Patient's level of motivation  [] Expected progression of patient      HISTORY:(criteria)    [] 41850 - no personal factors/history    [] 26889 - has 1-2 personal factor/comorbidity     [] 96085 - has >3 personal factor/comorbidity     Body Regions:  [] Objective examination findings  [] Head     []  Neck  [] Trunk   [] Upper Extremity  [] Lower Extremity    Body Systems:  [] For communication ability, affect, cognition, language, and learning style: the assessment of the ability to make needs known, consciousness, orientation (person, place, and time), expected emotional /behavioral responses, and learning preferences (eg, learning barriers, education  needs)  [] For the neuromuscular system: a general assessment of gross coordinated movement (eg, balance, gait, locomotion, transfers, and transitions) and motor function  (motor control and motor learning)  [] For the musculoskeletal system: the assessment of gross symmetry, gross range of motion, gross strength, height, and weight  [] For the integumentary system: the assessment of pliability(texture), presence of scar formation, skin color, and skin integrity  [] For cardiovascular/pulmonary system: the assessment of heart rate, respiratory rate, blood pressure, and edema     Activity limitations:    [] Patient's cognitive status and saf ety concerns          [] Status of current condition      [] Weight bearing restriction  [] Cardiopulmunary Restriction    Participation Restrictions:   [] Goals and goal agreement with the patient     [] Rehab potential (prognosis) and probable outcome      Examination of Body  System: (criteria)    [] 11359 - addressing 1-2 elements    [] 79244 - addressing a total of 3 or more elements     [] 25396 -  Addressing a total of 4 or more elements         Clinical Presentation: (criteria)  Choose one     On examination of body system using standardized tests and measures patient presents with (CHOOSE ONE) elements from any of the following: body structures and functions, activity limitations, and/or participation restrictions.  Leading to a clinical presentation that is considered (CHOOSE ONE)                              Clinical Decision Making  (Eval Complexity):  Choose One     Time Tracking:     PT Received On: 12/30/17  PT Start Time: 1024     PT Stop Time: 1052  PT Total Time (min): 28 min     Billable Minutes: Evaluation 15 and Therapeutic Activity 13      Luz Marina Robbins PT  12/30/2017

## 2017-12-30 NOTE — ASSESSMENT & PLAN NOTE
- leukocytosis with bibasilar opacities on CXR, concern for PNA  - will continue Ceftriaxone (tolerated in ED) and Zithromax  - bronchodilators/acapella/respiratory culture  - check UA/UC

## 2017-12-30 NOTE — PLAN OF CARE
Problem: Patient Care Overview  Goal: Plan of Care Review  Outcome: Ongoing (interventions implemented as appropriate)  Patient on Ra.  Sats 95 to 100%.Tx given. Gina @ bedside.  Pt. in no distress, will continue to monitor.

## 2017-12-30 NOTE — HPI
Ms. Connell is a 89 year old woman who presented to the ED with wheezing, shortness of breath and cough for one day.  Family reported she had been anorexic, tired and weak for the preceding 3 days.  She did not have fever, chills, GI or urinary tract symptoms.      Patient's medical history is notable for HTN and dementia.  She does not have a history of lung disease and is a lifelong non-smoker.  She is a patient of Dr. Cerrato but most recent office note is from 2014.       ED evaluation showed WBC 15.94, Na 128; CXR showed bibasilar opacities    Admitted for further evaluation and treatment.

## 2017-12-30 NOTE — ASSESSMENT & PLAN NOTE
- leukocytosis with bibasilar opacities on CXR, concern for PNA  - will continue Ceftriaxone (tolerated in ED) and Zithromax day 3  - bronchodilators/acapella/respiratory culture

## 2017-12-30 NOTE — SUBJECTIVE & OBJECTIVE
Past Medical History:   Diagnosis Date    Annual physical exam 6/22/2011    Cataract     History of mammogram 6/22/2011    Hypertension     Macular degeneration     Osteoporosis     Posterior vitreous detachment of both eyes     Tachycardia     Thyroid disease     hypothyroidism       Past Surgical History:   Procedure Laterality Date    CARDIAC SURGERY         Review of patient's allergies indicates:   Allergen Reactions    Penicillins Rash    Sulfa (sulfonamide antibiotics) Rash       No current facility-administered medications on file prior to encounter.      Current Outpatient Prescriptions on File Prior to Encounter   Medication Sig    NON FORMULARY MEDICATION 2000 vit D  One iron  Calcium    trandolapril (MAVIK) 2 MG Tab Take 1 tablet (2 mg total) by mouth once daily.    verapamil (CALAN-SR) 240 MG CR tablet TAKE 1 TABLET BY MOUTH EVERY DAY    alendronate (FOSAMAX) 70 MG tablet Take 1 tablet (70 mg total) by mouth every 7 days.     Family History     Problem Relation (Age of Onset)    Aneurysm Father    Cancer Sister    Hepatitis Sister    Hypertension Daughter    Pneumonia Mother        Social History Main Topics    Smoking status: Never Smoker    Smokeless tobacco: Never Used    Alcohol use No    Drug use: No    Sexual activity: No     Review of Systems   Constitutional: Positive for activity change, appetite change and fatigue. Negative for fever.   HENT: Negative for congestion, rhinorrhea and sore throat.    Respiratory: Positive for cough and shortness of breath.    Cardiovascular: Negative for chest pain and leg swelling.   Gastrointestinal: Negative for abdominal distention, abdominal pain, diarrhea, nausea and vomiting.   Genitourinary: Negative for dysuria and frequency.   Musculoskeletal: Negative for back pain and neck pain.   Skin: Negative for color change.   Neurological: Negative for dizziness, syncope, light-headedness and numbness.   Psychiatric/Behavioral: Negative for  agitation.     Objective:     Vital Signs (Most Recent):  Temp: 98.5 °F (36.9 °C) (12/29/17 2014)  Pulse: 87 (12/29/17 2014)  Resp: 16 (12/29/17 2014)  BP: 111/64 (12/29/17 2014)  SpO2: 95 % (12/29/17 2014) Vital Signs (24h Range):  Temp:  [97.9 °F (36.6 °C)-98.5 °F (36.9 °C)] 98.5 °F (36.9 °C)  Pulse:  [] 87  Resp:  [16-22] 16  SpO2:  [95 %-100 %] 95 %  BP: ()/(51-77) 111/64     Weight: 59 kg (130 lb)  Body mass index is 25.39 kg/m².    Physical Exam   Constitutional: She appears well-developed and well-nourished. No distress.   Elderly female in no distress   HENT:   Head: Normocephalic.   Mouth/Throat: Oropharynx is clear and moist.   Eyes: Conjunctivae are normal. Right eye exhibits no discharge. Left eye exhibits no discharge. No scleral icterus.   Neck: Normal range of motion. Neck supple.   Cardiovascular: Normal rate, regular rhythm and intact distal pulses.    Pulmonary/Chest: No respiratory distress. She has no rales.   Diminished bases b/l, exp wheezes, coarse b/s   Abdominal: Soft. Bowel sounds are normal. She exhibits no distension. There is no tenderness.   Musculoskeletal: Normal range of motion. She exhibits no edema or tenderness.   Neurological: She is alert.   Grossly non-focal   Skin: Skin is warm and dry. She is not diaphoretic.   Psychiatric: She has a normal mood and affect.   Nursing note and vitals reviewed.          Significant Labs:   CBC:   Recent Labs  Lab 12/29/17  1045   WBC 15.94*   HGB 12.0   HCT 36.4*   *     CMP:   Recent Labs  Lab 12/29/17  1045   *   K 3.6   CL 95   CO2 19*   *   BUN 15   CREATININE 0.8   CALCIUM 8.8   ANIONGAP 14   EGFRNONAA >60     Cardiac Markers:   Recent Labs  Lab 12/29/17  1045   *       Significant Imaging: I have reviewed all pertinent imaging results/findings within the past 24 hours.   Imaging Results          X-Ray Chest AP Portable (Final result)  Result time 12/29/17 11:07:17    Final result by Gage GAMEZ  MD Obi (12/29/17 11:07:17)                 Impression:     Bibasilar opacities, likely atelectasis with possible tiny left effusion.      Electronically signed by: Dr. Gage Crocker  Date:     12/29/17  Time:    11:07              Narrative:    Portable chest    Comparison: June 22, 2001    Findings: The cardiac silhouette is upper limits of normal. Mediastinum unremarkable. Lungs demonstrate bibasilar opacity with suspected small left-sided effusion. Osseous structures unchanged.

## 2017-12-30 NOTE — ASSESSMENT & PLAN NOTE
- Hx chronic hyponatremia, could be related to PNA vs volume depletion from poor po  - gentle hydration

## 2017-12-30 NOTE — ASSESSMENT & PLAN NOTE
- Hx chronic hyponatremia, could be related to PNA vs volume depletion from poor po  - DC IVF  - Check urine sodium < 20.  - Monitor

## 2017-12-30 NOTE — PROGRESS NOTES
[facility] Hospital Medicine  Progress Note    Patient Name: Radha Connell  MRN: [id number]  Patient Class: IP- Inpatient   Admission Date: 12/29/2017  Length of Stay: 1 days  Attending Physician: Anthony Ramires MD  Primary Care Provider: Ivelisse Cerrato MD        Subjective:     Principal Problem:Community acquired pneumonia    HPI:  89 year old female presents to ED with c/o SOB that started today associated with cough. Per family members patient has been feeling more fatigued, weak for the last 3 days with poor appetite. Denies fever, chills, N/V/D, dysuria, urinary frequency. Hx of HTN, non-smoker.     ED evaluation showed WBC 15.94, Na 128; CXR with bibasilar opacities    Admitted for further evaluation and treatment    Hospital Course:  No notes on file    No new subjective & objective note has been filed under this hospital service since the last note was generated.    Assessment/Plan:      * Community acquired pneumonia    - leukocytosis with bibasilar opacities on CXR, concern for PNA  - will continue Ceftriaxone (tolerated in ED) and Zithromax day 2  - bronchodilators/acapella/respiratory culture            Acute cystitis without hematuria    - Urine culture pending.  - Rocephin day 2            Anemia    - macrocytic anemia  - Fe, B-12, folate not low.             Thrombocytopenia    - no evidence of bleeding  - monitor          Hyponatremia    - Hx chronic hyponatremia, could be related to PNA vs volume depletion from poor po  - DC IVF  - Minimize antibiotics in D5.  - Check urine sodium < 20.  - Monitor              Hypertension    - Resume meds.  - Monitor.          VTE Risk Mitigation         Ordered     enoxaparin injection 30 mg  Daily     Route:  Subcutaneous        12/30/17 1428     Medium Risk of VTE  Once      12/29/17 1344     Place sequential compression device  Until discontinued      12/29/17 1344     Place BURTON hose  Until discontinued      12/29/17 1344              Anthony REYNA  MD Ike  Department of Hospital Medicine   [facility]

## 2017-12-31 PROBLEM — R06.03 RESPIRATORY DISTRESS: Status: ACTIVE | Noted: 2017-12-31

## 2017-12-31 LAB
ALLENS TEST: ABNORMAL
ANION GAP SERPL CALC-SCNC: 8 MMOL/L
BACTERIA UR CULT: NORMAL
BASOPHILS # BLD AUTO: 0 K/UL
BASOPHILS NFR BLD: 0 %
BUN SERPL-MCNC: 13 MG/DL
CALCIUM SERPL-MCNC: 8.5 MG/DL
CHLORIDE SERPL-SCNC: 98 MMOL/L
CO2 SERPL-SCNC: 23 MMOL/L
CREAT SERPL-MCNC: 0.8 MG/DL
DELSYS: ABNORMAL
DIFFERENTIAL METHOD: ABNORMAL
EOSINOPHIL # BLD AUTO: 0 K/UL
EOSINOPHIL NFR BLD: 0 %
ERYTHROCYTE [DISTWIDTH] IN BLOOD BY AUTOMATED COUNT: 15.6 %
EST. GFR  (AFRICAN AMERICAN): >60 ML/MIN/1.73 M^2
EST. GFR  (NON AFRICAN AMERICAN): >60 ML/MIN/1.73 M^2
FIO2: 100
FLOW: 15
GLUCOSE SERPL-MCNC: 122 MG/DL
HCO3 UR-SCNC: 22.4 MMOL/L (ref 24–28)
HCT VFR BLD AUTO: 32.7 %
HGB BLD-MCNC: 11 G/DL
LYMPHOCYTES # BLD AUTO: 0.5 K/UL
LYMPHOCYTES NFR BLD: 7.2 %
MAGNESIUM SERPL-MCNC: 1.9 MG/DL
MCH RBC QN AUTO: 33.3 PG
MCHC RBC AUTO-ENTMCNC: 33.6 G/DL
MCV RBC AUTO: 99 FL
MODE: ABNORMAL
MONOCYTES # BLD AUTO: 0.1 K/UL
MONOCYTES NFR BLD: 1.5 %
NEUTROPHILS # BLD AUTO: 5.9 K/UL
NEUTROPHILS NFR BLD: 91.3 %
PCO2 BLDA: 33.7 MMHG (ref 35–45)
PH SMN: 7.43 [PH] (ref 7.35–7.45)
PHOSPHATE SERPL-MCNC: 3.3 MG/DL
PLATELET # BLD AUTO: 121 K/UL
PMV BLD AUTO: 11.3 FL
PO2 BLDA: 149 MMHG (ref 80–100)
POC BE: -2 MMOL/L
POC SATURATED O2: 99 % (ref 95–100)
POTASSIUM SERPL-SCNC: 4.3 MMOL/L
RBC # BLD AUTO: 3.3 M/UL
SAMPLE: ABNORMAL
SITE: ABNORMAL
SODIUM SERPL-SCNC: 129 MMOL/L
SP02: 100
TROPONIN I SERPL DL<=0.01 NG/ML-MCNC: 0.01 NG/ML
WBC # BLD AUTO: 6.55 K/UL

## 2017-12-31 PROCEDURE — 63600175 PHARM REV CODE 636 W HCPCS: Performed by: HOSPITALIST

## 2017-12-31 PROCEDURE — 25000003 PHARM REV CODE 250: Performed by: INTERNAL MEDICINE

## 2017-12-31 PROCEDURE — 84100 ASSAY OF PHOSPHORUS: CPT

## 2017-12-31 PROCEDURE — 94640 AIRWAY INHALATION TREATMENT: CPT

## 2017-12-31 PROCEDURE — 25000003 PHARM REV CODE 250: Performed by: HOSPITALIST

## 2017-12-31 PROCEDURE — 27000221 HC OXYGEN, UP TO 24 HOURS

## 2017-12-31 PROCEDURE — 27000173 HC ACAPELLA DEVICE DH OR DM

## 2017-12-31 PROCEDURE — 99900035 HC TECH TIME PER 15 MIN (STAT)

## 2017-12-31 PROCEDURE — 25000242 PHARM REV CODE 250 ALT 637 W/ HCPCS: Performed by: PHYSICIAN ASSISTANT

## 2017-12-31 PROCEDURE — 82803 BLOOD GASES ANY COMBINATION: CPT

## 2017-12-31 PROCEDURE — 83735 ASSAY OF MAGNESIUM: CPT

## 2017-12-31 PROCEDURE — 85025 COMPLETE CBC W/AUTO DIFF WBC: CPT

## 2017-12-31 PROCEDURE — 93010 ELECTROCARDIOGRAM REPORT: CPT | Mod: ,,, | Performed by: INTERNAL MEDICINE

## 2017-12-31 PROCEDURE — 20000000 HC ICU ROOM

## 2017-12-31 PROCEDURE — 94664 DEMO&/EVAL PT USE INHALER: CPT

## 2017-12-31 PROCEDURE — 99233 SBSQ HOSP IP/OBS HIGH 50: CPT | Mod: ,,, | Performed by: HOSPITALIST

## 2017-12-31 PROCEDURE — 93005 ELECTROCARDIOGRAM TRACING: CPT

## 2017-12-31 PROCEDURE — 80048 BASIC METABOLIC PNL TOTAL CA: CPT

## 2017-12-31 PROCEDURE — 36415 COLL VENOUS BLD VENIPUNCTURE: CPT

## 2017-12-31 PROCEDURE — 84484 ASSAY OF TROPONIN QUANT: CPT | Mod: 91

## 2017-12-31 PROCEDURE — 94761 N-INVAS EAR/PLS OXIMETRY MLT: CPT

## 2017-12-31 RX ORDER — CEFTRIAXONE 1 G/1
1 INJECTION, POWDER, FOR SOLUTION INTRAMUSCULAR; INTRAVENOUS
Status: DISCONTINUED | OUTPATIENT
Start: 2018-01-01 | End: 2018-01-02

## 2017-12-31 RX ORDER — OSELTAMIVIR PHOSPHATE 75 MG/1
75 CAPSULE ORAL 2 TIMES DAILY
Status: DISCONTINUED | OUTPATIENT
Start: 2017-12-31 | End: 2018-01-04

## 2017-12-31 RX ORDER — FUROSEMIDE 10 MG/ML
20 INJECTION INTRAMUSCULAR; INTRAVENOUS ONCE
Status: DISCONTINUED | OUTPATIENT
Start: 2017-12-31 | End: 2017-12-31

## 2017-12-31 RX ADMIN — IPRATROPIUM BROMIDE AND ALBUTEROL SULFATE 3 ML: .5; 3 SOLUTION RESPIRATORY (INHALATION) at 07:12

## 2017-12-31 RX ADMIN — IPRATROPIUM BROMIDE AND ALBUTEROL SULFATE 3 ML: .5; 3 SOLUTION RESPIRATORY (INHALATION) at 12:12

## 2017-12-31 RX ADMIN — ENOXAPARIN SODIUM 30 MG: 100 INJECTION SUBCUTANEOUS at 04:12

## 2017-12-31 RX ADMIN — OSELTAMIVIR PHOSPHATE 75 MG: 75 CAPSULE ORAL at 08:12

## 2017-12-31 RX ADMIN — IPRATROPIUM BROMIDE AND ALBUTEROL SULFATE 3 ML: .5; 3 SOLUTION RESPIRATORY (INHALATION) at 08:12

## 2017-12-31 RX ADMIN — CEFTRIAXONE 1 G: 1 INJECTION, SOLUTION INTRAVENOUS at 09:12

## 2017-12-31 RX ADMIN — VERAPAMIL HYDROCHLORIDE 240 MG: 120 TABLET, FILM COATED, EXTENDED RELEASE ORAL at 09:12

## 2017-12-31 RX ADMIN — GUAIFENESIN AND DEXTROMETHORPHAN 10 ML: 100; 10 SYRUP ORAL at 05:12

## 2017-12-31 RX ADMIN — AZITHROMYCIN 250 MG: 250 TABLET, FILM COATED ORAL at 09:12

## 2017-12-31 RX ADMIN — PREDNISONE 20 MG: 20 TABLET ORAL at 09:12

## 2017-12-31 RX ADMIN — GUAIFENESIN AND DEXTROMETHORPHAN 10 ML: 100; 10 SYRUP ORAL at 12:12

## 2017-12-31 RX ADMIN — GUAIFENESIN AND DEXTROMETHORPHAN 10 ML: 100; 10 SYRUP ORAL at 11:12

## 2017-12-31 RX ADMIN — BENAZEPRIL HYDROCHLORIDE 5 MG: 5 TABLET, COATED ORAL at 09:12

## 2017-12-31 RX ADMIN — RAMELTEON 8 MG: 8 TABLET, FILM COATED ORAL at 08:12

## 2017-12-31 NOTE — ASSESSMENT & PLAN NOTE
- Hx chronic hyponatremia, could be related to PNA vs volume depletion from poor po  - DC IVF  - Minimize antibiotics in D5.  - Check urine sodium < 20.  - Monitor  - Improved to 128.

## 2017-12-31 NOTE — PLAN OF CARE
Problem: Patient Care Overview  Goal: Plan of Care Review  Outcome: Ongoing (interventions implemented as appropriate)  Pt  VSS and afebrile. Pt on 2L NC and in no distress. Pt denies any SOB or CP. Resting comfortably. U/O x 1.

## 2017-12-31 NOTE — PROGRESS NOTES
See Progress Note for today.  Patient was doing better versus yesterday.       Subsequently,     Notified or respiratory distress.   Patient was doing well this AM, improved from yesterday, lungs sounded better, and she just completed a breathing treatment and developed respiratory distress.  Lungs sound constricted.      Will send to ICU.  CXR, ABG, EKG, Lasix 20 IV, PCC consulted.

## 2017-12-31 NOTE — PROGRESS NOTES
[facility] Hospital Medicine  Progress Note    Patient Name: Radha Connell  MRN: [id number]  Patient Class: IP- Inpatient   Admission Date: 12/29/2017  Length of Stay: 2 days  Attending Physician: Anthony Ramires MD  Primary Care Provider: Ivelisse Cerrato MD        Subjective:     Principal Problem:Community acquired pneumonia    HPI:  89 year old female presents to ED with c/o SOB that started today associated with cough. Per family members patient has been feeling more fatigued, weak for the last 3 days with poor appetite. Denies fever, chills, N/V/D, dysuria, urinary frequency. Hx of HTN, non-smoker.     ED evaluation showed WBC 15.94, Na 128; CXR with bibasilar opacities    Admitted for further evaluation and treatment    Hospital Course:  No notes on file    Interval History:   Pt new to me.  Feeling better, but still poor with wheezing.        Review of Systems   Constitutional: Negative for diaphoresis and fever.   Eyes: Negative for discharge and visual disturbance.   Respiratory: Positive for wheezing. Negative for cough and shortness of breath.    Cardiovascular: Negative for chest pain and palpitations.   Gastrointestinal: Negative for abdominal pain and nausea.   Genitourinary: Negative for difficulty urinating and dysuria.   Musculoskeletal: Negative for arthralgias and myalgias.   Skin: Negative for color change and rash.   Neurological: Negative for dizziness and numbness.   Psychiatric/Behavioral: Negative for agitation and confusion.     Objective:     Vital Signs (Most Recent):  Temp: 98.5 °F (36.9 °C) (12/30/17 0803)  Pulse: 80 (12/30/17 1315)  Resp: 18 (12/30/17 1315)  BP: 120/63 (12/30/17 0803)  SpO2: 96 % (12/30/17 1315) Vital Signs (24h Range):  Temp:  [98.1 °F (36.7 °C)-98.5 °F (36.9 °C)] 98.5 °F (36.9 °C)  Pulse:  [62-96] 80  Resp:  [16-20] 18  SpO2:  [92 %-100 %] 96 %  BP: (107-135)/(55-65) 120/63     Weight: 59 kg (130 lb)  Body mass index is 25.39 kg/m².    Intake/Output  Summary (Last 24 hours) at 12/30/17 1422  Last data filed at 12/30/17 0645   Gross per 24 hour   Intake           421.25 ml   Output              405 ml   Net            16.25 ml      Physical Exam   Constitutional: She appears well-developed and well-nourished.   HENT:   Head: Normocephalic and atraumatic.   Eyes: Conjunctivae are normal. Pupils are equal, round, and reactive to light.   Neck: Normal range of motion. Neck supple.   Cardiovascular: Regular rhythm and normal heart sounds.    Pulmonary/Chest: Effort normal. She has wheezes.   Abdominal: Soft. Bowel sounds are normal. There is no tenderness.   Musculoskeletal: She exhibits no edema or tenderness.   Neurological: She is alert.   Responds appropriately   Skin: Skin is warm and dry.       Significant Labs:   CBC:   Recent Labs  Lab 12/29/17  1045   WBC 15.94*   HGB 12.0   HCT 36.4*   *     CMP:   Recent Labs  Lab 12/29/17  1045 12/30/17  0358   * 125*   K 3.6 3.5   CL 95 94*   CO2 19* 21*   * 91   BUN 15 17   CREATININE 0.8 0.8   CALCIUM 8.8 8.3*   PROT  --  6.0   ALBUMIN  --  2.9*   BILITOT  --  0.4   ALKPHOS  --  59   AST  --  26   ALT  --  18   ANIONGAP 14 10   EGFRNONAA >60 >60       Significant Imaging: I have reviewed all pertinent imaging results/findings within the past 24 hours.   Imaging Results          X-Ray Chest AP Portable (Final result)  Result time 12/29/17 11:07:17    Final result by Gage Crocker MD (12/29/17 11:07:17)                 Impression:     Bibasilar opacities, likely atelectasis with possible tiny left effusion.      Electronically signed by: Dr. Gage Crocker  Date:     12/29/17  Time:    11:07              Narrative:    Portable chest    Comparison: June 22, 2001    Findings: The cardiac silhouette is upper limits of normal. Mediastinum unremarkable. Lungs demonstrate bibasilar opacity with suspected small left-sided effusion. Osseous structures unchanged.                             Assessment/Plan:      * Community acquired pneumonia    - leukocytosis with bibasilar opacities on CXR, concern for PNA  - will continue Ceftriaxone (tolerated in ED) and Zithromax day 3  - bronchodilators/acapella/respiratory culture            Acute cystitis without hematuria    - Urine culture pending.  - Rocephin day 3            Anemia    - macrocytic anemia  - Fe, B-12, folate not low.             Thrombocytopenia    - no evidence of bleeding  - monitor          Hyponatremia    - Hx chronic hyponatremia, could be related to PNA vs volume depletion from poor po  - DC IVF  - Minimize antibiotics in D5.  - Check urine sodium < 20.  - Monitor  - Improved to 128.                 Hypertension    - Resume meds.  - Monitor.          VTE Risk Mitigation         Ordered     enoxaparin injection 30 mg  Daily     Route:  Subcutaneous        12/30/17 1428     Medium Risk of VTE  Once      12/29/17 1344     Place sequential compression device  Until discontinued      12/29/17 1344     Place BURTON hose  Until discontinued      12/29/17 1344              Anthony Ramires MD  Department of Hospital Medicine   [facility]

## 2017-12-31 NOTE — PROGRESS NOTES
Rapid Response was called approximately 1300 soon after respiratory treatment which was given @ 1247. Upon arrival, pt appeared in distress with SOB and tachypnic. Simple face mask was applied with SPO2 being 99%. Pronounced audible expiratory wheezing was heard without stethoscope. With stethoscope, BBS are diminished with little air movement and coarse. Pt was placed on 100% NRB and transported to ICU for further monitoring.

## 2017-12-31 NOTE — CONSULTS
Pulmonary / Critical Care Medicine     Consult received.  Patient seen, examined, and chart reviewed.  It sounds like she had some bronchospasm followed by a panic attack.  At this time she is resting comfortably on 2L NC with otherwise normal vital signs.  On physical exam, she has some faint expiratory wheezes.  Blood gas demonstrates some hyperventilation and her chest radiograph is unremarkable.  At this point, I have not additional recommendations.  Continue antibiotics and bronchodilators as ordered.  Consider stopping corticosteroids as she has no known reactive airway disease.    Full consult to follow.    Winston Castorena MD  Pulmonary / Critical Care Fellow  12/31/2017  2:26 PM

## 2017-12-31 NOTE — NURSING
Patient's family declined 0000 vitals, and wished to hold cough syrup for now to let patient sleep.

## 2017-12-31 NOTE — PLAN OF CARE
Problem: Patient Care Overview  Goal: Plan of Care Review  Outcome: Ongoing (interventions implemented as appropriate)  Pt on RA. Sats 94%. No distress noted. Aerosol tx tolerated well. Acapella done with good efffort. Will continue to monitor.

## 2017-12-31 NOTE — NURSING
Patient in respirtaroy distress.  Sating 97% with Face mask applied on 7L.  Tachypnic.  Tachycardic on monitor.  Rapid Response initiated.  Patient transferred to ICU.

## 2017-12-31 NOTE — PROGRESS NOTES
#20 g HL x 1 attempt to R Forearm, site flushes easily free of comps, site secured.  Dr. Ramires, ICU staff, resp at bedside.  Pt will be transported to ICU #6 by ICU staff and 3CC charge nurse with port O2 and card monitor. Orders given to ICU staff by Dr. Ramires.

## 2018-01-01 LAB — TROPONIN I SERPL DL<=0.01 NG/ML-MCNC: 0.02 NG/ML

## 2018-01-01 PROCEDURE — 25000003 PHARM REV CODE 250: Performed by: INTERNAL MEDICINE

## 2018-01-01 PROCEDURE — 63600175 PHARM REV CODE 636 W HCPCS: Performed by: HOSPITALIST

## 2018-01-01 PROCEDURE — 27000221 HC OXYGEN, UP TO 24 HOURS

## 2018-01-01 PROCEDURE — 27000173 HC ACAPELLA DEVICE DH OR DM

## 2018-01-01 PROCEDURE — 25000242 PHARM REV CODE 250 ALT 637 W/ HCPCS: Performed by: INTERNAL MEDICINE

## 2018-01-01 PROCEDURE — 99900035 HC TECH TIME PER 15 MIN (STAT)

## 2018-01-01 PROCEDURE — 94761 N-INVAS EAR/PLS OXIMETRY MLT: CPT

## 2018-01-01 PROCEDURE — 99233 SBSQ HOSP IP/OBS HIGH 50: CPT | Mod: ,,, | Performed by: HOSPITALIST

## 2018-01-01 PROCEDURE — 20000000 HC ICU ROOM

## 2018-01-01 PROCEDURE — 25000003 PHARM REV CODE 250: Performed by: HOSPITALIST

## 2018-01-01 PROCEDURE — 92610 EVALUATE SWALLOWING FUNCTION: CPT

## 2018-01-01 PROCEDURE — 94640 AIRWAY INHALATION TREATMENT: CPT

## 2018-01-01 PROCEDURE — 94664 DEMO&/EVAL PT USE INHALER: CPT

## 2018-01-01 PROCEDURE — 25000003 PHARM REV CODE 250: Performed by: PHYSICIAN ASSISTANT

## 2018-01-01 PROCEDURE — 99223 1ST HOSP IP/OBS HIGH 75: CPT | Mod: GC,,, | Performed by: INTERNAL MEDICINE

## 2018-01-01 PROCEDURE — 93005 ELECTROCARDIOGRAM TRACING: CPT

## 2018-01-01 RX ORDER — HALOPERIDOL 5 MG/ML
1 INJECTION INTRAMUSCULAR ONCE
Status: DISCONTINUED | OUTPATIENT
Start: 2018-01-01 | End: 2018-01-01

## 2018-01-01 RX ORDER — ALPRAZOLAM 0.25 MG/1
0.25 TABLET ORAL ONCE
Status: COMPLETED | OUTPATIENT
Start: 2018-01-01 | End: 2018-01-01

## 2018-01-01 RX ORDER — IPRATROPIUM BROMIDE AND ALBUTEROL SULFATE 2.5; .5 MG/3ML; MG/3ML
3 SOLUTION RESPIRATORY (INHALATION) EVERY 4 HOURS PRN
Status: DISCONTINUED | OUTPATIENT
Start: 2018-01-01 | End: 2018-01-05 | Stop reason: HOSPADM

## 2018-01-01 RX ORDER — HALOPERIDOL 5 MG/ML
0.5 INJECTION INTRAMUSCULAR ONCE
Status: DISCONTINUED | OUTPATIENT
Start: 2018-01-01 | End: 2018-01-01

## 2018-01-01 RX ADMIN — ENOXAPARIN SODIUM 30 MG: 100 INJECTION SUBCUTANEOUS at 06:01

## 2018-01-01 RX ADMIN — OSELTAMIVIR PHOSPHATE 75 MG: 75 CAPSULE ORAL at 09:01

## 2018-01-01 RX ADMIN — IPRATROPIUM BROMIDE AND ALBUTEROL SULFATE 3 ML: .5; 3 SOLUTION RESPIRATORY (INHALATION) at 12:01

## 2018-01-01 RX ADMIN — IPRATROPIUM BROMIDE AND ALBUTEROL SULFATE 3 ML: .5; 3 SOLUTION RESPIRATORY (INHALATION) at 07:01

## 2018-01-01 RX ADMIN — PREDNISONE 20 MG: 20 TABLET ORAL at 09:01

## 2018-01-01 RX ADMIN — ALPRAZOLAM 0.25 MG: 0.25 TABLET ORAL at 03:01

## 2018-01-01 RX ADMIN — DIPHENHYDRAMINE HYDROCHLORIDE 25 MG: 25 CAPSULE ORAL at 12:01

## 2018-01-01 RX ADMIN — VERAPAMIL HYDROCHLORIDE 240 MG: 120 TABLET, FILM COATED, EXTENDED RELEASE ORAL at 09:01

## 2018-01-01 RX ADMIN — AZITHROMYCIN 250 MG: 250 TABLET, FILM COATED ORAL at 09:01

## 2018-01-01 RX ADMIN — CEFTRIAXONE SODIUM 1 G: 1 INJECTION, POWDER, FOR SOLUTION INTRAMUSCULAR; INTRAVENOUS at 09:01

## 2018-01-01 RX ADMIN — GUAIFENESIN AND DEXTROMETHORPHAN 10 ML: 100; 10 SYRUP ORAL at 07:01

## 2018-01-01 RX ADMIN — BENAZEPRIL HYDROCHLORIDE 5 MG: 5 TABLET, COATED ORAL at 09:01

## 2018-01-01 RX ADMIN — IPRATROPIUM BROMIDE AND ALBUTEROL SULFATE 3 ML: .5; 3 SOLUTION RESPIRATORY (INHALATION) at 09:01

## 2018-01-01 RX ADMIN — IPRATROPIUM BROMIDE AND ALBUTEROL SULFATE 3 ML: .5; 3 SOLUTION RESPIRATORY (INHALATION) at 02:01

## 2018-01-01 NOTE — PLAN OF CARE
Problem: Patient Care Overview  Goal: Plan of Care Review  Outcome: Ongoing (interventions implemented as appropriate)  Pt resting on 2L Nc, 2 prn txs given. Will continue to monitor.

## 2018-01-01 NOTE — ASSESSMENT & PLAN NOTE
- Was transferred to ICU  - Seems aspiration may have been most likely cause of acute episode yesterday.   - Discussed with PCC following.

## 2018-01-01 NOTE — PLAN OF CARE
Problem: SLP Goal  Goal: SLP Goal  1. Pt will be able to consume thin liquids in 3-5 mls amounts from a spoon with no signs of airway threat or aspiration.  2. Ongoing evaluation of swallowing.  3. Consideration of MBS  Outcome: Ongoing (interventions implemented as appropriate)  Bedside swallow evaluation completed. Pt with overt signs of pharyngeal dysphagia and airway threat with thin liquids via cup or straw. Benefitted from use of controlled volume and liquids via spoon to eliminate signs of aspiration    Comments: Speech to follow 4-5x/week for remediation of dysphagia

## 2018-01-01 NOTE — ASSESSMENT & PLAN NOTE
- leukocytosis with bibasilar opacities on CXR, concern for PNA  - will continue Ceftriaxone (tolerated in ED) and Zithromax day 4  - Blood and sputum cultures no growth to date.   - bronchodilators/acapella

## 2018-01-01 NOTE — PROGRESS NOTES
Called to pts room this morning @ 0718 due to pt being in distress;pt was tachypneic and tachycardic. PRN treatment was given;pt re-oriented by [facility] and was able to be calmed. SPO2 98%. No changes were made. Will continue to monitor.

## 2018-01-01 NOTE — NURSING
Pt awake, confused, and anxious. Pt became tachypneic and tachycardic. Pt was re-oriented and able to be calmed. PRN albuterol breathing treatment given and pt calm and resting with O2 >92 % on 2 L NC.

## 2018-01-01 NOTE — NURSING
Code status clarified with Dr. Castorena regarding his prior discussion with pt's son. Per MD, pt is a DNR/DNI. Verbal order received.

## 2018-01-01 NOTE — EICU
eICU Note :    Called by the [facility] eRN:    Problem:Active Wheezing    Pertinent History and labs reviewed :89 y/f with Pneumonia with chronic Hyponatremia with Fe deficiency anemia      Treatment /Intervention given:DuoNeb treatment given stat and every 4 hours when necessary        Anita Dupree M.D  eICU Physician  2:56 AM  Asked to camera in , pt slightly tachypneic ,talkitive , somewhat delirious, SATs 100% on 2L ,unable to give haldol Qtc 472 , will try Xanax .25 mg x1 stat

## 2018-01-01 NOTE — ASSESSMENT & PLAN NOTE
- Hx chronic hyponatremia, could be related to PNA vs volume depletion from poor po  - DC IVF  - Minimize antibiotics in D5.  - Check urine sodium < 20.  - Monitor  - Improved.

## 2018-01-01 NOTE — PROGRESS NOTES
[facility] Hospital Medicine  Progress Note    Patient Name: Radha Connell  MRN: [id number]  Patient Class: IP- Inpatient   Admission Date: 12/29/2017  Length of Stay: 3 days  Attending Physician: Anthony Ramires MD  Primary Care Provider: Ivelisse Cerrato MD        Subjective:     Principal Problem:Community acquired pneumonia    HPI:  89 year old female presents to ED with c/o SOB that started today associated with cough. Per family members patient has been feeling more fatigued, weak for the last 3 days with poor appetite. Denies fever, chills, N/V/D, dysuria, urinary frequency. Hx of HTN, non-smoker.     ED evaluation showed WBC 15.94, Na 128; CXR with bibasilar opacities    Admitted for further evaluation and treatment    Hospital Course:  No notes on file    Interval History:   RN reports had similar episode of resp distress this AM, but oxygen saturation was okay.  Then resolved, and patient may have had panic.        Review of Systems   Constitutional: Negative for diaphoresis and fever.   Eyes: Negative for discharge and visual disturbance.   Respiratory: Positive for wheezing. Negative for cough and shortness of breath.    Cardiovascular: Negative for chest pain and palpitations.   Gastrointestinal: Negative for abdominal pain and nausea.   Genitourinary: Negative for difficulty urinating and dysuria.   Musculoskeletal: Negative for arthralgias and myalgias.   Skin: Negative for color change and rash.   Neurological: Negative for dizziness and numbness.   Psychiatric/Behavioral: Negative for agitation and confusion.     Objective:     Vital Signs (Most Recent):  Temp: 97.9 °F (36.6 °C) (01/01/18 0715)  Pulse: 106 (01/01/18 0830)  Resp: (!) 33 (01/01/18 0830)  BP: (!) 166/80 (01/01/18 0800)  SpO2: (!) 94 % (01/01/18 0830) Vital Signs (24h Range):  Temp:  [97.4 °F (36.3 °C)-98.4 °F (36.9 °C)] 97.9 °F (36.6 °C)  Pulse:  [] 106  Resp:  [17-38] 33  SpO2:  [85 %-100 %] 94 %  BP:  ()/() 166/80     Weight: 63.7 kg (140 lb 6.9 oz)  Body mass index is 27.43 kg/m².    Intake/Output Summary (Last 24 hours) at 01/01/18 1004  Last data filed at 01/01/18 0300   Gross per 24 hour   Intake              330 ml   Output                0 ml   Net              330 ml      Physical Exam   Constitutional: She is oriented to person, place, and time. She appears well-developed and well-nourished.   HENT:   Head: Normocephalic and atraumatic.   Eyes: Conjunctivae are normal. Pupils are equal, round, and reactive to light.   Neck: Normal range of motion. Neck supple.   Cardiovascular: Regular rhythm and normal heart sounds.    Pulmonary/Chest: Effort normal. She has wheezes.   Abdominal: Soft. Bowel sounds are normal. There is no tenderness.   Musculoskeletal: She exhibits no edema or tenderness.   Neurological: She is alert and oriented to person, place, and time.   Responds appropriately   Skin: Skin is warm and dry.       Significant Labs:   BMP:   Recent Labs  Lab 12/31/17  0517   *   *   K 4.3   CL 98   CO2 23   BUN 13   CREATININE 0.8   CALCIUM 8.5*   MG 1.9     CBC:   Recent Labs  Lab 12/31/17 0517   WBC 6.55   HGB 11.0*   HCT 32.7*   *       Significant Imaging: I have reviewed all pertinent imaging results/findings within the past 24 hours.   Imaging Results          X-Ray Chest 1 View (Final result)  Result time 12/31/17 13:48:58    Final result by Marla Disla MD (12/31/17 13:48:58)                 Impression:      Finding suggesting central hilar congestive change, no large focal consolidation.  There is slight obscuration of the left costophrenic angle, possibly reflecting trace effusion or related to overlying soft tissue.      Electronically signed by: MARLA DISLA MD  Date:     12/31/17  Time:    13:48              Narrative:    Chest one view    Indication:Respiratory distress    Comparison:12/29/2017    Findings:  The cardiomediastinal silhouette is  prominent, similar to the previous exam, noting calcification of the aortic arch.  There is a possible left effusion.  The trachea is midline.  The lungs are symmetrically expanded bilaterally with prominent interstitial and parenchymal attenuation in a perihilar distribution, suggesting congestive change. No large focal consolidation seen.  There is no pneumothorax.  The osseous structures are remarkable for degenerative changes noted posttraumatic change of the right humerus.                             X-Ray Chest AP Portable (Final result)  Result time 12/29/17 11:07:17    Final result by Gage Crocker MD (12/29/17 11:07:17)                 Impression:     Bibasilar opacities, likely atelectasis with possible tiny left effusion.      Electronically signed by: Dr. Gage Crocker  Date:     12/29/17  Time:    11:07              Narrative:    Portable chest    Comparison: June 22, 2001    Findings: The cardiac silhouette is upper limits of normal. Mediastinum unremarkable. Lungs demonstrate bibasilar opacity with suspected small left-sided effusion. Osseous structures unchanged.                            Assessment/Plan:      * Community acquired pneumonia    - leukocytosis with bibasilar opacities on CXR, concern for PNA  - will continue Ceftriaxone (tolerated in ED) and Zithromax day 4  - Blood and sputum cultures no growth to date.   - bronchodilators/acapella            Respiratory distress    - Was transferred to ICU  - Seems aspiration may have been most likely cause of acute episode yesterday.   - Discussed with PCC following.            Acute cystitis without hematuria    - Urine culture no significant growth.  - Rocephin day 4  - Treated.             Anemia    - macrocytic anemia  - Fe, B-12, folate not low.             Thrombocytopenia    - no evidence of bleeding  - monitor          Hyponatremia    - Hx chronic hyponatremia, could be related to PNA vs volume depletion from poor po  - DC IVF  -  Minimize antibiotics in D5.  - Check urine sodium < 20.  - Monitor  - Improved.                 Hypertension    - Resume meds.  - Monitor.          VTE Risk Mitigation         Ordered     enoxaparin injection 30 mg  Daily     Route:  Subcutaneous        12/30/17 1428     Medium Risk of VTE  Once      12/29/17 1344     Place sequential compression device  Until discontinued      12/29/17 1344     Place BURTON hose  Until discontinued      12/29/17 1344              Anthony Ramires MD  Department of Hospital Medicine   [facility]

## 2018-01-01 NOTE — PLAN OF CARE
Problem: Patient Care Overview  Goal: Plan of Care Review  Outcome: Ongoing (interventions implemented as appropriate)  Pt remained free from falls and injury throughout the night.  VSS.  Elevated BP during times of agitation.  Frequent uop on bedpan.  Pt became increasingly anxious and confused throughout the night.  eICU notified, MD ordered Xanax for anxiety.  Remains dyspneic on exertion with expiratory wheezing that is moderately helped with breathing treatments x2.  Will continue to monitor.  Pt resting with bed alarm on.

## 2018-01-01 NOTE — CONSULTS
Pulmonary / Critical Care Medicine  Consult Note    Primary Attending:  Anthony Ramires MD   Primary Team: Hospital Medicine   Consultant Attending: Sherrie Dick MD   Consultant Fellow: Winston Castorena MD     Reason for Consult  Hypoxic respiratory failure     History of Present Illness:  Ms. Connell is a 89 year old lady with a history of hypertension, who was admitted 4 days ago with a week long history of cough, dyspnea, wheezing and overall malaise.  She was found to have a leukocytosis and bibasilar opacities on chest radiograph.  She was diagnosed with CAP, admitted, started on IV antibiotics, and over the course of the several days improved clinically.  By all accounts, she was recovering appropriately until yesterday afternoon, when she acutely developed respiratory distress after a nebulized breathing treatment.  She was subsequently transferred to the ICU, and improved rapidly, without any intervention.  By the time I examined her, she was doing well and was without complaint.     Past Medical History:   Annual physical exam    Cataract    History of mammogram    Hypertension    Macular degeneration    Osteoporosis    Posterior vitreous detachment of both eyes    Tachycardia    Thyroid disease           Past Surgical History:   CARDIAC SURGERY        Allergies:   Penicillins    Sulfa (sulfonamide antibiotics)        Medications:   Home Medications:     NON FORMULARY MEDICATION 2000 vit D  One iron  Calcium    trandolapril (MAVIK) 2 MG Tab Take 1 tablet (2 mg total) by mouth once daily.    verapamil (CALAN-SR) 240 MG CR tablet TAKE 1 TABLET BY MOUTH EVERY DAY    alendronate (FOSAMAX) 70 MG tablet Take 1 tablet (70 mg total) by mouth every 7 days.         Current Medications:  Scheduled:   azithromycin  250 mg Daily    benazepril  5 mg Daily    cefTRIAXone (ROCEPHIN) IVPB  1 g Q24H    dextromethorphan-guaifenesin  mg/5 ml  10 mL Q6H    enoxaparin  30 mg Daily    oseltamivir  75 mg BID     predniSONE  20 mg Daily    verapamil  240 mg Daily       Continuous Infusions:      PRN:   acetaminophen, albuterol-ipratropium 2.5mg-0.5mg/3mL, diphenhydrAMINE, ondansetron, senna-docusate 8.6-50 mg, sodium chloride 0.9%     Social History:  Tobacco:   Life long non-smoker   EtOH:   Denies   Illicit Drugs:   Denies     Family History:  Mother: family history includes Aneurysm in her father; Cancer in her sister; Hepatitis in her sister; Hypertension in her daughter; Pneumonia in her mother.   Father: As mentioned above     Review of Systems:  · Other than those symptoms mentioned above, an extensive review of systems was unremarkable.     Vital Signs   Temp:  [97.4 °F (36.3 °C)-98.4 °F (36.9 °C)]   Pulse:  []   Resp:  [17-38]   BP: ()/()   SpO2:  [85 %-100 %]    Physical Exam   Gen: well developed, well nourished, no distress   HEENT: lips, mucosa, and tongue normal; teeth and gums normal and no throat erythema  conjunctivae/corneas clear. PERRL., pupils responsive   CVS: regular rate and rhythm, S1, S2 normal, no murmur   Chest: wheezes bilaterally and on exhalation   Abdomen: soft, non-tender non-distended; bowel sounds normal   Ext: warm, well perfused and no cyanosis or edema, or clubbing   Skin: no rashes, no ecchymoses, no petechiae   Neuro: normal, not oriented to place   Lines: IV      Labs   PH 7.429    PCO2 33.7*    PO2 149*    HCO3 22.4*    POCSATURATED 99    BE -2    TROPONINI  0.022      Imaging CXR   01/01/2018   I personally reviewed the films and findings are:, unremarkable        Other Studies:   PFTs      None on file      Micro Blood Cx 12/29 NGTD   Sputum Cx 12/30 NGTD      Assessment/Plan:  1. Likely viral upper respiratory tract infection  2. Suspected CAP  3. Dementia  · Doing great today.  · Continue antimicrobial coverage for typical pulmonary pathogens.  Consider de-esacalation to oral medications  · No additional recommendations at this time.  · Will continue to  follow.    Winston Castorena MD  Pulmonary / Critical Care Fellow  Cell [phone]  01/01/2018  10:11 AM

## 2018-01-01 NOTE — SUBJECTIVE & OBJECTIVE
Interval History:   RN reports had similar episode of resp distress this AM, but oxygen saturation was okay.  Then resolved, and patient may have had panic.        Review of Systems   Constitutional: Negative for diaphoresis and fever.   Eyes: Negative for discharge and visual disturbance.   Respiratory: Positive for wheezing. Negative for cough and shortness of breath.    Cardiovascular: Negative for chest pain and palpitations.   Gastrointestinal: Negative for abdominal pain and nausea.   Genitourinary: Negative for difficulty urinating and dysuria.   Musculoskeletal: Negative for arthralgias and myalgias.   Skin: Negative for color change and rash.   Neurological: Negative for dizziness and numbness.   Psychiatric/Behavioral: Negative for agitation and confusion.     Objective:     Vital Signs (Most Recent):  Temp: 97.9 °F (36.6 °C) (01/01/18 0715)  Pulse: 106 (01/01/18 0830)  Resp: (!) 33 (01/01/18 0830)  BP: (!) 166/80 (01/01/18 0800)  SpO2: (!) 94 % (01/01/18 0830) Vital Signs (24h Range):  Temp:  [97.4 °F (36.3 °C)-98.4 °F (36.9 °C)] 97.9 °F (36.6 °C)  Pulse:  [] 106  Resp:  [17-38] 33  SpO2:  [85 %-100 %] 94 %  BP: ()/() 166/80     Weight: 63.7 kg (140 lb 6.9 oz)  Body mass index is 27.43 kg/m².    Intake/Output Summary (Last 24 hours) at 01/01/18 1004  Last data filed at 01/01/18 0300   Gross per 24 hour   Intake              330 ml   Output                0 ml   Net              330 ml      Physical Exam   Constitutional: She is oriented to person, place, and time. She appears well-developed and well-nourished.   HENT:   Head: Normocephalic and atraumatic.   Eyes: Conjunctivae are normal. Pupils are equal, round, and reactive to light.   Neck: Normal range of motion. Neck supple.   Cardiovascular: Regular rhythm and normal heart sounds.    Pulmonary/Chest: Effort normal. She has wheezes.   Abdominal: Soft. Bowel sounds are normal. There is no tenderness.   Musculoskeletal: She exhibits no  edema or tenderness.   Neurological: She is alert and oriented to person, place, and time.   Responds appropriately   Skin: Skin is warm and dry.       Significant Labs:   BMP:   Recent Labs  Lab 12/31/17  0517   *   *   K 4.3   CL 98   CO2 23   BUN 13   CREATININE 0.8   CALCIUM 8.5*   MG 1.9     CBC:   Recent Labs  Lab 12/31/17  0517   WBC 6.55   HGB 11.0*   HCT 32.7*   *       Significant Imaging: I have reviewed all pertinent imaging results/findings within the past 24 hours.   Imaging Results          X-Ray Chest 1 View (Final result)  Result time 12/31/17 13:48:58    Final result by Marla Disla MD (12/31/17 13:48:58)                 Impression:      Finding suggesting central hilar congestive change, no large focal consolidation.  There is slight obscuration of the left costophrenic angle, possibly reflecting trace effusion or related to overlying soft tissue.      Electronically signed by: MARLA DISLA MD  Date:     12/31/17  Time:    13:48              Narrative:    Chest one view    Indication:Respiratory distress    Comparison:12/29/2017    Findings:  The cardiomediastinal silhouette is prominent, similar to the previous exam, noting calcification of the aortic arch.  There is a possible left effusion.  The trachea is midline.  The lungs are symmetrically expanded bilaterally with prominent interstitial and parenchymal attenuation in a perihilar distribution, suggesting congestive change. No large focal consolidation seen.  There is no pneumothorax.  The osseous structures are remarkable for degenerative changes noted posttraumatic change of the right humerus.                             X-Ray Chest AP Portable (Final result)  Result time 12/29/17 11:07:17    Final result by Gage Crocker MD (12/29/17 11:07:17)                 Impression:     Bibasilar opacities, likely atelectasis with possible tiny left effusion.      Electronically signed by: Dr. Almonte  Obi  Date:     12/29/17  Time:    11:07              Narrative:    Portable chest    Comparison: June 22, 2001    Findings: The cardiac silhouette is upper limits of normal. Mediastinum unremarkable. Lungs demonstrate bibasilar opacity with suspected small left-sided effusion. Osseous structures unchanged.

## 2018-01-02 PROBLEM — D53.9 ANEMIA, MACROCYTIC: Status: ACTIVE | Noted: 2017-12-29

## 2018-01-02 LAB
ANION GAP SERPL CALC-SCNC: 12 MMOL/L
BACTERIA SPEC AEROBE CULT: NORMAL
BASOPHILS # BLD AUTO: 0.02 K/UL
BASOPHILS NFR BLD: 0.2 %
BUN SERPL-MCNC: 12 MG/DL
CALCIUM SERPL-MCNC: 8.7 MG/DL
CHLORIDE SERPL-SCNC: 90 MMOL/L
CO2 SERPL-SCNC: 23 MMOL/L
CREAT SERPL-MCNC: 0.7 MG/DL
DIFFERENTIAL METHOD: ABNORMAL
EOSINOPHIL # BLD AUTO: 0 K/UL
EOSINOPHIL NFR BLD: 0 %
ERYTHROCYTE [DISTWIDTH] IN BLOOD BY AUTOMATED COUNT: 15.4 %
EST. GFR  (AFRICAN AMERICAN): >60 ML/MIN/1.73 M^2
EST. GFR  (NON AFRICAN AMERICAN): >60 ML/MIN/1.73 M^2
GLUCOSE SERPL-MCNC: 108 MG/DL
GRAM STN SPEC: NORMAL
HCT VFR BLD AUTO: 37.6 %
HGB BLD-MCNC: 12.3 G/DL
LYMPHOCYTES # BLD AUTO: 0.9 K/UL
LYMPHOCYTES NFR BLD: 8.8 %
MAGNESIUM SERPL-MCNC: 1.9 MG/DL
MCH RBC QN AUTO: 31.9 PG
MCHC RBC AUTO-ENTMCNC: 32.7 G/DL
MCV RBC AUTO: 97 FL
MONOCYTES # BLD AUTO: 0.5 K/UL
MONOCYTES NFR BLD: 5.3 %
NEUTROPHILS # BLD AUTO: 8.2 K/UL
NEUTROPHILS NFR BLD: 84.2 %
PHOSPHATE SERPL-MCNC: 3 MG/DL
PLATELET # BLD AUTO: 153 K/UL
PMV BLD AUTO: 10.6 FL
POTASSIUM SERPL-SCNC: 3.9 MMOL/L
RBC # BLD AUTO: 3.86 M/UL
SODIUM SERPL-SCNC: 125 MMOL/L
WBC # BLD AUTO: 9.68 K/UL

## 2018-01-02 PROCEDURE — 25000003 PHARM REV CODE 250: Performed by: INTERNAL MEDICINE

## 2018-01-02 PROCEDURE — 94640 AIRWAY INHALATION TREATMENT: CPT

## 2018-01-02 PROCEDURE — 63600175 PHARM REV CODE 636 W HCPCS: Performed by: HOSPITALIST

## 2018-01-02 PROCEDURE — 36415 COLL VENOUS BLD VENIPUNCTURE: CPT

## 2018-01-02 PROCEDURE — 25000003 PHARM REV CODE 250: Performed by: HOSPITALIST

## 2018-01-02 PROCEDURE — 92526 ORAL FUNCTION THERAPY: CPT

## 2018-01-02 PROCEDURE — 85025 COMPLETE CBC W/AUTO DIFF WBC: CPT

## 2018-01-02 PROCEDURE — 84100 ASSAY OF PHOSPHORUS: CPT

## 2018-01-02 PROCEDURE — 99232 SBSQ HOSP IP/OBS MODERATE 35: CPT | Mod: GC,,, | Performed by: INTERNAL MEDICINE

## 2018-01-02 PROCEDURE — 63600175 PHARM REV CODE 636 W HCPCS: Performed by: STUDENT IN AN ORGANIZED HEALTH CARE EDUCATION/TRAINING PROGRAM

## 2018-01-02 PROCEDURE — 94664 DEMO&/EVAL PT USE INHALER: CPT

## 2018-01-02 PROCEDURE — 11000001 HC ACUTE MED/SURG PRIVATE ROOM

## 2018-01-02 PROCEDURE — 94761 N-INVAS EAR/PLS OXIMETRY MLT: CPT

## 2018-01-02 PROCEDURE — 80048 BASIC METABOLIC PNL TOTAL CA: CPT

## 2018-01-02 PROCEDURE — 25000242 PHARM REV CODE 250 ALT 637 W/ HCPCS: Performed by: INTERNAL MEDICINE

## 2018-01-02 PROCEDURE — 27000173 HC ACAPELLA DEVICE DH OR DM

## 2018-01-02 PROCEDURE — 27000221 HC OXYGEN, UP TO 24 HOURS

## 2018-01-02 PROCEDURE — 99233 SBSQ HOSP IP/OBS HIGH 50: CPT | Mod: ,,, | Performed by: HOSPITALIST

## 2018-01-02 PROCEDURE — 99900035 HC TECH TIME PER 15 MIN (STAT)

## 2018-01-02 PROCEDURE — 97530 THERAPEUTIC ACTIVITIES: CPT

## 2018-01-02 PROCEDURE — 83735 ASSAY OF MAGNESIUM: CPT

## 2018-01-02 RX ORDER — DOXYCYCLINE HYCLATE 100 MG
100 TABLET ORAL EVERY 12 HOURS
Status: DISCONTINUED | OUTPATIENT
Start: 2018-01-02 | End: 2018-01-05 | Stop reason: HOSPADM

## 2018-01-02 RX ORDER — AZITHROMYCIN 250 MG/1
250 TABLET, FILM COATED ORAL ONCE
Status: COMPLETED | OUTPATIENT
Start: 2018-01-02 | End: 2018-01-02

## 2018-01-02 RX ADMIN — ENOXAPARIN SODIUM 30 MG: 100 INJECTION SUBCUTANEOUS at 04:01

## 2018-01-02 RX ADMIN — IPRATROPIUM BROMIDE AND ALBUTEROL SULFATE 3 ML: .5; 3 SOLUTION RESPIRATORY (INHALATION) at 02:01

## 2018-01-02 RX ADMIN — DOXYCYCLINE HYCLATE 100 MG: 100 TABLET, COATED ORAL at 08:01

## 2018-01-02 RX ADMIN — BENAZEPRIL HYDROCHLORIDE 5 MG: 5 TABLET, COATED ORAL at 10:01

## 2018-01-02 RX ADMIN — LORAZEPAM 0.25 MG: 2 INJECTION INTRAMUSCULAR at 02:01

## 2018-01-02 RX ADMIN — PREDNISONE 20 MG: 20 TABLET ORAL at 09:01

## 2018-01-02 RX ADMIN — VERAPAMIL HYDROCHLORIDE 240 MG: 120 TABLET, FILM COATED, EXTENDED RELEASE ORAL at 09:01

## 2018-01-02 RX ADMIN — OSELTAMIVIR PHOSPHATE 75 MG: 75 CAPSULE ORAL at 09:01

## 2018-01-02 RX ADMIN — IPRATROPIUM BROMIDE AND ALBUTEROL SULFATE 3 ML: .5; 3 SOLUTION RESPIRATORY (INHALATION) at 01:01

## 2018-01-02 RX ADMIN — DOXYCYCLINE HYCLATE 100 MG: 100 TABLET, COATED ORAL at 09:01

## 2018-01-02 RX ADMIN — OSELTAMIVIR PHOSPHATE 75 MG: 75 CAPSULE ORAL at 08:01

## 2018-01-02 RX ADMIN — AZITHROMYCIN 250 MG: 250 TABLET, FILM COATED ORAL at 09:01

## 2018-01-02 NOTE — PT/OT/SLP PROGRESS
"Physical Therapy Treatment    Patient Name:  Radha Connell   MRN:  [id number]    Recommendations:     Discharge Recommendations:   (pt came from an assisted living facility in [place] , however will be staying in [place] until she is strong enough to go back. Will talk to PT regarding SNF placement, pt would benefit from cont PT in SNF at this time.)   Discharge Equipment Recommendations: none   Barriers to discharge: Decreased caregiver support    Assessment:     Radha Connell is a 89 y.o. female admitted with a medical diagnosis of Community acquired pneumonia.  She presents with the following impairments/functional limitations:  weakness, impaired endurance, impaired balance, impaired self care skills, impaired functional mobilty, gait instability, impaired cardiopulmonary response to activity ; pt with c/o's fatigue, sleeping upon entering room this afternoon. O2:93-94% on 3-4L of O2 with minimal mobility OOB. Feel pt would benefit from cont PT in SNF to regain strength and improve endurance, will have PT address discharge recommendations.    Rehab Prognosis:  Good/excellent; patient would benefit from acute skilled PT services to address these deficits and reach maximum level of function.      Recent Surgery: * No surgery found *      Plan:     During this hospitalization, patient to be seen 5 x/week to address the above listed problems via gait training, therapeutic activities, therapeutic exercises, neuromuscular re-education  · Plan of Care Expires:  01/29/18   Plan of Care Reviewed with: patient, daughter    Subjective     Communicated with nurse prior to session.  Patient found sidelying in bed upon PT entry to room, agreeable to treatment.      Chief Complaint: fatigue  Patient comments/goals: "I'm just so tired". Pt's daughter present as well.  Pain/Comfort:  · Pain Rating 1: 0/10  · Pain Rating Post-Intervention 1: 0/10    Patients cultural, spiritual, Episcopal conflicts given the current situation: " none specified    Objective:     Patient found with: peripheral IV, oxygen (O2@3L)     General Precautions: Standard, aspiration, fall   Orthopedic Precautions:N/A   Braces: N/A     Functional Mobility:  · Bed Mobility:     · Supine to Sit: contact guard assistance and minimum assistance  · Transfers:     · Sit to Stand:  minimum assistance with rolling walker   · Gait: pt took a few steps bed to chair with RW and min.A, O2@3-4L      AM-PAC 6 CLICK MOBILITY  Turning over in bed (including adjusting bedclothes, sheets and blankets)?: 3  Sitting down on and standing up from a chair with arms (e.g., wheelchair, bedside commode, etc.): 3  Moving from lying on back to sitting on the side of the bed?: 3  Moving to and from a bed to a chair (including a wheelchair)?: 3  Need to walk in hospital room?: 3  Climbing 3-5 steps with a railing?: 3  Total Score: 18       Therapeutic Activities and Exercises:   Perf'd seated heelraises, LAQ's x 10 ea.  Perf'd sit to stand t/f's x 3 trials with min/modA with RW, pt with posterior lean and req'd assistance for static balance. Pt. Not feeling well and needing to sit down, unable to attempt ambulation at this time.    Patient left up in chair with all lines intact, call button in reach, nurse notified and daughter present..    GOALS:    Physical Therapy Goals        Problem: Physical Therapy Goal    Goal Priority Disciplines Outcome Goal Variances Interventions   Physical Therapy Goal     PT/OT, PT Ongoing (interventions implemented as appropriate)     Description:  Goals to be met by: 1/10/18     Patient will increase functional independence with mobility by performin. Sit<>stand transfer with supervision using rolling walker.   2. Gait > 150 feet with supervision using rolling walker.   3. Ascend/descend full flight stairs with unilateral handrails with CGA with or without AD.                      Time Tracking:     PT Received On: 18  PT Start Time: 1450     PT Stop  Time: 1530  PT Total Time (min): 40 min     Billable Minutes: Therapeutic Activity 30 min (speech therapist with pt at EOB x 10 min- not billable from PT)    Treatment Type: Treatment  PT/PTA: PTA     PTA Visit Number: 1     Maxine Alvarado PTA  01/02/2018

## 2018-01-02 NOTE — SUBJECTIVE & OBJECTIVE
Interval History:  Patient is new to me.  She has a dry cough this morning and has difficulty holding a conversation.  She believes she is at home.  MBSS scheduled.    Review of Systems   Constitutional: Positive for fatigue. Negative for chills and fever.   Respiratory: Positive for cough and shortness of breath. Negative for wheezing.    Cardiovascular: Negative for chest pain and palpitations.     Objective:     Vital Signs (Most Recent):  Temp: 97.5 °F (36.4 °C) (01/02/18 0300)  Pulse: 86 (01/02/18 0700)  Resp: 19 (01/02/18 0700)  BP: 128/75 (01/02/18 0700)  SpO2: (!) 94 % (01/02/18 0700) Vital Signs (24h Range):  Temp:  [97.5 °F (36.4 °C)-98 °F (36.7 °C)] 97.5 °F (36.4 °C)  Pulse:  [] 86  Resp:  [15-36] 19  SpO2:  [85 %-100 %] 94 %  BP: ()/() 128/75     Weight: 63.7 kg (140 lb 6.9 oz)  Body mass index is 27.43 kg/m².  No intake or output data in the 24 hours ending 01/02/18 0802   Physical Exam   Constitutional: She appears well-developed and well-nourished.   Frail, elderly woman, coughing.   HENT:   Head: Normocephalic.   Eyes: Conjunctivae are normal. Pupils are equal, round, and reactive to light.   Neck: Neck supple. No thyromegaly present.   Cardiovascular: Normal rate, regular rhythm and intact distal pulses.  Exam reveals no gallop and no friction rub.    Murmur heard.  Pulmonary/Chest: Effort normal. She has no wheezes. She has no rales.   Coughing.   Abdominal: Soft. Bowel sounds are normal. She exhibits no distension. There is no tenderness.   Musculoskeletal: Normal range of motion. She exhibits no edema.   Lymphadenopathy:     She has no cervical adenopathy.   Neurological: She is alert.   Not oriented to time or place.   Skin: Skin is warm and dry. No rash noted.   Psychiatric: She has a normal mood and affect. Her behavior is normal.       Significant Labs: All pertinent labs within the past 24 hours have been reviewed.    Significant Imaging: I have reviewed all pertinent  imaging results/findings within the past 24 hours.

## 2018-01-02 NOTE — PLAN OF CARE
Problem: Patient Care Overview  Goal: Plan of Care Review  Outcome: Ongoing (interventions implemented as appropriate)  Patient awake most of the night and confused.  Short of breath and wheezing  when agitated and trying to climb out of bed.  Resp Treatments x3.  VSS.  Free from falls and injury.

## 2018-01-02 NOTE — NURSING TRANSFER
Nursing Transfer Note      1/2/2018     Transfer from ICU bed 6 to 3CC bed 325    Transfer via wheelchair  Transfer with  Pillow and blanket  Transported by Lian GAMEZ RN  Medicines sent: NA  Chart send with patient: yes  Notified:Carol MARTINEZ    Patient reassessed at:   Upon arrival to floor: bed in lowest position, bed alarm on zone 2, call light in reach, patient oriented to call dont fall and call light.

## 2018-01-02 NOTE — PROGRESS NOTES
Pulmonary / Critical Care Medicine  Progress Note  S: No major issues overnight.  Doing well this morning, without complaint.       O: VS: Temp:           97.5-98.3 °F  Pulse:             Resp:           15-36   BP:               ()/()   SpO2:            %    I/O:   Intake 50 ml   Output 0 ml   Net 50 ml       PE well developed, well nourished, no distress  lips, mucosa, and tongue normal; teeth and gums normal  conjunctivae/corneas clear. PERRL.  regular rate and rhythm, S1, S2 normal, no murmur  clear to auscultation bilaterally, normal respiratory effort and normal percussion bilaterally  soft, non-tender non-distended; bowel sounds normal  warm, well perfused and no cyanosis or edema, or clubbing    Lines PIV    Labs   WBC 9.68   RBC 3.86   HGB 12.3   HCT 37.6      MCV 97   MCH 31.9   MCHC 32.7      K 3.9   CL 90   CO2 23   BUN 12   CREATININE 0.7   MG 1.9       Imaging CXR 01/02/2018 No new images       Micro Blood Cx 12/30 NGTD   Sputum Cx 12/29 Normal patsy       Medications Scheduled    benazepril  5 mg Daily    doxycycline  100 mg Q12H    enoxaparin  30 mg Daily    oseltamivir  75 mg BID    predniSONE  20 mg Daily    verapamil  240 mg Daily      PRN   acetaminophen, albuterol-ipratropium 2.5mg-0.5mg/3mL, diphenhydrAMINE, ondansetron, senna-docusate 8.6-50 mg, sodium chloride 0.9%           A/P:  1. Viral upper respiratory tract infection  2. Suspected CAP  3. Dementia  ? Doing great today.  ? Continue antimicrobial coverage for typical pulmonary pathogens.  ? No additional recommendations at this time.  ? PCCM will sign off at this time.  Please re-consult as needed.    Winston Castorena MD  [employer] Pulmonary / Critical Care Fellow  [phone]  01/02/2018  11:48 AM

## 2018-01-02 NOTE — PLAN OF CARE
ATTN: TEAM ROSEMARY PLANNING    PLAN: RETURN WITH FAMILY LIVES IN ASSISTIVE LIVING IN [place]     PER SHAMIKA MARTINEZ CM Pt and family are visiting from [place] for [place]. Pt lives in an assisted living facility in [place] and will return there at time of discharge. Family states likely no CM needs for discharge     IF ANY NEED ARISE PLEASE CONTACT RNCM / SSW TEAM    NIRAJ ALVARADO ON CASE ALSO # [id number]     Norma Dominguez RN  Case management 1/2/201812:58 PM  # [phone] (FAX) [phone]     01/02/18 1259   Discharge Assessment   Assessment Type Discharge Planning Reassessment

## 2018-01-02 NOTE — PLAN OF CARE
Luz (daughter) - [phone]  SON(  POA )DAVID #  - HE OK IF YOU CALL SISTER LUZ FOR DC PLANNING ( SISTER LIVES IN [facility] )   -----------------------------  ATTN: TEAM DC PLANNING- UPDATE      PLAN A : SNF FAMILY REQUEST - UNSURE IF PT MEETS CRITERIA ?  PER DAUGHTER AND SNF - HOWEVER PENDING PT /OT EVALS - CHOICES [facility]  AND [facility]      PLAN B: RETURN TO DAUGHTER HOME [address] [place] [id number]- SHE / LIZBETH STATED NO HEAT@ HER HOUSE NOW  - IFO GIVEN ABOUT  BECKIE MARTINEZ ON SITE # [phone]- TO DAUGHTER       PLAN C:  FAMILY DECLINES  RETURN WITH FAMILY LIVES IN ASSISTIVE LIVING IN [place]   PER SHAMIKA MARTINEZ CM Pt and family are visiting from [place] for [place]. Pt lives in an assisted living facility in [place] and will return there at time of discharge. Family states likely no CM needs for discharge     IF ANY NEED ARISE PLEASE CONTACT RNCM / SSW TEAM    NIRAJ ALVARADO ON CASE ALSO # [id number]     Norma Dominguez RN  Case management   # [phone] (FAX) [phone]     01/02/18 1421   Discharge Assessment   Assessment Type Discharge Planning Reassessment

## 2018-01-02 NOTE — ASSESSMENT & PLAN NOTE
- Leukocytosis with bibasilar opacities on CXR on admission  - Completes 5 day course of azithromycin today.  OK to change ceftriaxone to oral Augmentin.  - Blood and sputum cultures show no growth to date.   - Continue bronchodilators and a capella device.

## 2018-01-02 NOTE — ASSESSMENT & PLAN NOTE
- Transferred to ICU  - Seems aspiration may have been most likely cause of acute episode yesterday - MBSS ordered  - Back to baseline currently - will transfer back to floor after MBSS.  - Discussed with PCC following.

## 2018-01-02 NOTE — EICU
Notified of agitation      89 y/o F presented with fever, cough and shortness of breath being treated fo CAP with clinial improvement.  According to bedside nurse patient has not had any sleep for a couple of days.  Benadryl did not work.    Camera assessment: agitated with attempts of removing face mask while undergoing nebulization    · Ordered Ativan 0.25 mg x1 now.  Caution due to age and respiratory status.  Failed swallow eval as per OT.

## 2018-01-02 NOTE — ASSESSMENT & PLAN NOTE
- History of chronic hyponatremia, could be related to PNA vs volume depletion from poor oral intake  - IV fluids discontinued  - Urine sodium appropriately low

## 2018-01-02 NOTE — HOSPITAL COURSE
Patient was admitted on IV antibiotics for presumed CAP.  Her wheezing and overall condition improved, but she continued to have episodes of respiratory distress associated with panicking, eventually was transferred to the ICU on 1/1 due to one of these episodes.  She was seen by Speech therapy for possible aspiration, and a modified barium swallow study was recommended initially but she actually passed a bedside swallow study when she was feeling better.  SNF was recommended.  At discharge she was oriented, tolerating a diet and able to work with therapy, although remained quite debilitated.

## 2018-01-02 NOTE — PT/OT/SLP PROGRESS
Occupational Therapy      Patient Name:  Radha Connell   MRN:  [id number]    Patient not seen today secondary to Patient fatigue. Pt attempted for OT evaluation this PM, however pt sleeping. Pt's daughter present at bedside and requesting to leave pt sleeping. Unable to re-attempt secondary to time restraints. Will follow-up with OT evaluation tomorrow.    Devorah Orr, STACEYR/YOLANDA  1/2/2018

## 2018-01-02 NOTE — PLAN OF CARE
Problem: Patient Care Overview  Goal: Plan of Care Review  Outcome: Ongoing (interventions implemented as appropriate)  Pt alert, but confused at times. VSS. On 2L NC when asleep. Family ay bedside. Pt resting comfortably.

## 2018-01-02 NOTE — PT/OT/SLP PROGRESS
Speech Language Pathology Treatment    Patient Name:  Radha Connell   MRN:  [id number]  Admitting Diagnosis: Community acquired pneumonia    Recommendations:                 General Recommendations:     1. Speech therapy 4-5x/week for evaluation and treatment of dysphagia  Diet recommendations:  Mechanical soft, Liquid Diet Level: Thin small sips via cup, NO STRAWS  Aspiration Precautions: HOB to 90 degrees, Monitor for s/s of aspiration, No straws, Remain upright 30 minutes post meal and Small bites/sips   General Precautions: Standard, aspiration      Subjective   · Pt awake, alert, sitting up at edge of bed    Objective:     Has the patient been evaluated by SLP for swallowing?   Yes  Keep patient NPO? No   Current Respiratory Status: nasal cannula      SWALLOWING: Increased ability to consume thin liquids via spoon and small, single sips via cup with no overt signs of aspiration on 7/7 trials. Onset of audible swallow and  cough 2/2 trials via straw.     PT/FAMILY EDUCATION: Daughter present. Discussed continued deferral of straws, liquids via cup in small single sips only at this time.     Assessment:     Radha Connell is a 89 y.o. female with an SLP diagnosis of Dysphagia.  She presents with improved tolerance of thin liquids in small sips. Continues to demonstrate signs of airway threat and or aspiration with large volume cups sips. MBS deferred this date due to improved tolerance of thin liquids with compensatory strategies and family hoping to not have to procedure    Goals:    SLP Goals        Problem: SLP Goal    Goal Priority Disciplines Outcome   SLP Goal     SLP Ongoing (interventions implemented as appropriate)   Description:  1. Pt will be able to consume thin liquids in 3-5 mls amounts from a spoon with no signs of airway threat or aspiration.  2. Ongoing evaluation of swallowing.  3. Consideration of MBS                    Plan:     · Patient to be seen:  5 x/week   · Plan of Care expires:   01/31/18  · Plan of Care reviewed with:  patient, daughter (MD)   · SLP Follow-Up:  Yes       Discharge recommendations:  assisted living facility       Time Tracking:     SLP Treatment Date:   01/02/18  Speech Start Time:  1500  Speech Stop Time:  1510     Speech Total Time (min):  10 min    Billable Minutes: Treatment Swallowing Dysfunction 10 min    Yuridia Martinez CCC-SLP  01/02/2018

## 2018-01-02 NOTE — PROGRESS NOTES
[facility] Hospital Medicine  Progress Note    Patient Name: Radha Connell  MRN: [id number]  Patient Class: IP- Inpatient   Admission Date: 12/29/2017  Length of Stay: 4 days  Attending Physician: Charlotte Perez MD  Primary Care Provider: Ivelisse Cerrato MD        Subjective:     Principal Problem:Community acquired pneumonia    HPI:  Ms. Connell is a 89 year old woman who presented to the ED with wheezing, shortness of breath and cough for one day.  Family reported she had been anorexic, tired and weak for the preceding 3 days.  She did not have fever, chills, GI or urinary tract symptoms.      Patient's medical history is notable for HTN and dementia.  She does not have a history of lung disease and is a lifelong non-smoker.  She is a patient of Dr. Cerrato but most recent office note is from 2014.       ED evaluation showed WBC 15.94, Na 128; CXR showed bibasilar opacities    Admitted for further evaluation and treatment.    Hospital Course:  Patient was admitted on IV antibiotics for presumed CAP.  Her wheezing and overall condition improved, but she continued to have episodes of respiratory distress associated with panicking, eventually was transferred to the ICU on 1/1 due to one of these episodes.  She was seen by Speech therapy for possible aspiration, and a modified barium swallow study was recommended.    Interval History:  Patient is new to me.  She has a dry cough this morning and has difficulty holding a conversation.  She believes she is at home.  MBSS scheduled.    Review of Systems   Constitutional: Positive for fatigue. Negative for chills and fever.   Respiratory: Positive for cough and shortness of breath. Negative for wheezing.    Cardiovascular: Negative for chest pain and palpitations.     Objective:     Vital Signs (Most Recent):  Temp: 97.5 °F (36.4 °C) (01/02/18 0300)  Pulse: 86 (01/02/18 0700)  Resp: 19 (01/02/18 0700)  BP: 128/75 (01/02/18 0700)  SpO2: (!) 94 % (01/02/18  0700) Vital Signs (24h Range):  Temp:  [97.5 °F (36.4 °C)-98 °F (36.7 °C)] 97.5 °F (36.4 °C)  Pulse:  [] 86  Resp:  [15-36] 19  SpO2:  [85 %-100 %] 94 %  BP: ()/() 128/75     Weight: 63.7 kg (140 lb 6.9 oz)  Body mass index is 27.43 kg/m².  No intake or output data in the 24 hours ending 01/02/18 0802   Physical Exam   Constitutional: She appears well-developed and well-nourished.   Frail, elderly woman, coughing.   HENT:   Head: Normocephalic.   Eyes: Conjunctivae are normal. Pupils are equal, round, and reactive to light.   Neck: Neck supple. No thyromegaly present.   Cardiovascular: Normal rate, regular rhythm and intact distal pulses.  Exam reveals no gallop and no friction rub.    Murmur heard.  Pulmonary/Chest: Effort normal. She has no wheezes. She has no rales.   Coughing.   Abdominal: Soft. Bowel sounds are normal. She exhibits no distension. There is no tenderness.   Musculoskeletal: Normal range of motion. She exhibits no edema.   Lymphadenopathy:     She has no cervical adenopathy.   Neurological: She is alert.   Not oriented to time or place.   Skin: Skin is warm and dry. No rash noted.   Psychiatric: She has a normal mood and affect. Her behavior is normal.       Significant Labs: All pertinent labs within the past 24 hours have been reviewed.    Significant Imaging: I have reviewed all pertinent imaging results/findings within the past 24 hours.    Assessment/Plan:      * Community acquired pneumonia    - Leukocytosis with bibasilar opacities on CXR on admission  - Completes 5 day course of azithromycin today.  OK to change ceftriaxone to oral Augmentin.  - Blood and sputum cultures show no growth to date.   - Continue bronchodilators and a capella device.            Respiratory distress    - Transferred to ICU  - Seems aspiration may have been most likely cause of acute episode yesterday - MBSS ordered  - Back to baseline currently - will transfer back to floor after MBSS.  -  Discussed with PCC following.            Acute cystitis without hematuria    - Urine culture no significant growth.  - Rocephin day 4  - Treated.             Anemia, macrocytic    - Worked up in the past and cause not found  - Normal iron, B12 and folic acid.            Thrombocytopenia    - no evidence of bleeding  - monitor          Hyponatremia    - History of chronic hyponatremia, could be related to PNA vs volume depletion from poor oral intake  - IV fluids discontinued  - Urine sodium appropriately low              Essential hypertension    - Continue current medication and monitor for hypotension.          VTE Risk Mitigation         Ordered     enoxaparin injection 30 mg  Daily     Route:  Subcutaneous        12/30/17 1428     Medium Risk of VTE  Once      12/29/17 1344     Place sequential compression device  Until discontinued      12/29/17 1344     Place BURTON hose  Until discontinued      12/29/17 1344              Charlotte Chacko MD  Department of Hospital Medicine   [facility]

## 2018-01-02 NOTE — NURSING
Patient confused and anxious, restless, panic attacks,  incontinent, urinates on herself frequently.  Tries to get out of bed repeatedly, sleeps for only about 10 minutes at a time.  RR 40,  Sats 91% when climbing out of bed. Lungs coarse and wheezy, resp treatments as needed.   Last several nights nurses report same activity.  eICU notified.

## 2018-01-03 PROBLEM — R06.03 RESPIRATORY DISTRESS: Status: RESOLVED | Noted: 2017-12-31 | Resolved: 2018-01-03

## 2018-01-03 LAB
BACTERIA BLD CULT: NORMAL
BACTERIA BLD CULT: NORMAL

## 2018-01-03 PROCEDURE — 63600175 PHARM REV CODE 636 W HCPCS: Performed by: HOSPITALIST

## 2018-01-03 PROCEDURE — 92526 ORAL FUNCTION THERAPY: CPT

## 2018-01-03 PROCEDURE — 25000003 PHARM REV CODE 250: Performed by: PHYSICIAN ASSISTANT

## 2018-01-03 PROCEDURE — 25000003 PHARM REV CODE 250: Performed by: HOSPITALIST

## 2018-01-03 PROCEDURE — 99900035 HC TECH TIME PER 15 MIN (STAT)

## 2018-01-03 PROCEDURE — 99233 SBSQ HOSP IP/OBS HIGH 50: CPT | Mod: ,,, | Performed by: HOSPITALIST

## 2018-01-03 PROCEDURE — 97116 GAIT TRAINING THERAPY: CPT

## 2018-01-03 PROCEDURE — 97165 OT EVAL LOW COMPLEX 30 MIN: CPT

## 2018-01-03 PROCEDURE — 25000003 PHARM REV CODE 250: Performed by: INTERNAL MEDICINE

## 2018-01-03 PROCEDURE — 27000221 HC OXYGEN, UP TO 24 HOURS

## 2018-01-03 PROCEDURE — 86580 TB INTRADERMAL TEST: CPT | Performed by: HOSPITALIST

## 2018-01-03 PROCEDURE — 97164 PT RE-EVAL EST PLAN CARE: CPT

## 2018-01-03 PROCEDURE — 11000001 HC ACUTE MED/SURG PRIVATE ROOM

## 2018-01-03 PROCEDURE — 94761 N-INVAS EAR/PLS OXIMETRY MLT: CPT

## 2018-01-03 PROCEDURE — 97530 THERAPEUTIC ACTIVITIES: CPT

## 2018-01-03 RX ADMIN — DIPHENHYDRAMINE HYDROCHLORIDE 25 MG: 25 CAPSULE ORAL at 11:01

## 2018-01-03 RX ADMIN — PREDNISONE 20 MG: 20 TABLET ORAL at 08:01

## 2018-01-03 RX ADMIN — BENAZEPRIL HYDROCHLORIDE 5 MG: 5 TABLET, COATED ORAL at 08:01

## 2018-01-03 RX ADMIN — DOXYCYCLINE HYCLATE 100 MG: 100 TABLET, COATED ORAL at 09:01

## 2018-01-03 RX ADMIN — OSELTAMIVIR PHOSPHATE 75 MG: 75 CAPSULE ORAL at 08:01

## 2018-01-03 RX ADMIN — TUBERCULIN PURIFIED PROTEIN DERIVATIVE 5 UNITS: 5 INJECTION INTRADERMAL at 04:01

## 2018-01-03 RX ADMIN — ENOXAPARIN SODIUM 30 MG: 100 INJECTION SUBCUTANEOUS at 04:01

## 2018-01-03 RX ADMIN — OSELTAMIVIR PHOSPHATE 75 MG: 75 CAPSULE ORAL at 09:01

## 2018-01-03 RX ADMIN — VERAPAMIL HYDROCHLORIDE 240 MG: 120 TABLET, FILM COATED, EXTENDED RELEASE ORAL at 08:01

## 2018-01-03 RX ADMIN — DOXYCYCLINE HYCLATE 100 MG: 100 TABLET, COATED ORAL at 08:01

## 2018-01-03 NOTE — PLAN OF CARE
Problem: Patient Care Overview  Goal: Plan of Care Review  Outcome: Ongoing (interventions implemented as appropriate)  Pt remains on 2LNC. No PRN tx required. No NT sx required. Pt has good cough when coached. Pt remains stable.

## 2018-01-03 NOTE — PLAN OF CARE
Problem: Occupational Therapy Goal  Goal: Occupational Therapy Goal  Goals to be met by: 2/2/2018     Patient will increase functional independence with ADLs by performing:    UE Dressing with Set-up Assistance.  LE Dressing with Contact Guard Assistance.  Grooming while standing at sink with Stand-by Assistance.  Toileting from bedside commode with Stand-by Assistance for hygiene and clothing management.   Stand pivot transfers with Stand-by Assistance.  Toilet transfer to bedside commode with Stand-by Assistance.  Pt will demonstrate improved endurance as evidence of completing G/H from standing position with no seated rest break and no SOB.     Outcome: Ongoing (interventions implemented as appropriate)  OT eval completed and goals set.     Devorah Orr, OTR/L  1/3/2018

## 2018-01-03 NOTE — PLAN OF CARE
[facility]   Department of Hospital Medicine  [address] [place] [id number]  [phone] (phone)  [phone] (fax)      Facility Transfer Orders                          Radha Connell    Admit to: PRELIMARY SNF ORDERS     Diagnoses:  Active Hospital Problems    Diagnosis  POA    *Community acquired pneumonia [J18.9]  Yes    Respiratory distress [R06.00]  Yes    Acute cystitis without hematuria [N30.00]  Yes    Hyponatremia [E87.1]  Yes    Anemia, macrocytic [D53.9]  Yes    Essential hypertension [I10]  Yes      Resolved Hospital Problems    Diagnosis Date Resolved POA   No resolved problems to display.       Allergies:  Review of patient's allergies indicates:   Allergen Reactions    Penicillins Rash    Sulfa (sulfonamide antibiotics) Rash       Vitals:       Routine, once monthly     Once weekly     Every shift (Skilled Nursing patients)    Diet:DENTAL SOFT          Activity:      - Up in a chair each morning as tolerated   - Ambulate with assistance to bathroom   - May ambulate independently   - Weight bearing:     Nursing Precautions:    - Aspiration precautions:             - Total assistance with meals            -  Upright 90 degrees befor during and after meals             -  Suction at bedside          - Fall precautions   - Seizure precaution   - Decubitus precautions:        -  for positioning   - Pressure reducing foam mattress   - Turn patient every two hours. Use wedge pillows to anchor patient              CONSULTS:      PT to evaluate and treat - five times a week     OT to evaluate and treat - five times a week        LABS:              Medications: Discontinue all previous medication orders, if any. See new list below.       Current Discharge Medication List      CONTINUE these medications which have NOT CHANGED    Details   NON FORMULARY MEDICATION 2000 vit D  One iron  Calcium      trandolapril (MAVIK) 2 MG Tab Take 1 tablet (2 mg  total) by mouth once daily.  Qty: 30 tablet, Refills: 11      verapamil (CALAN-SR) 240 MG CR tablet TAKE 1 TABLET BY MOUTH EVERY DAY  Qty: 30 tablet, Refills: 12      alendronate (FOSAMAX) 70 MG tablet Take 1 tablet (70 mg total) by mouth every 7 days.  Qty: 4 tablet, Refills: 11    Associated Diagnoses: Other osteoporosis

## 2018-01-03 NOTE — NURSING
BP 91/51.  Pt asymptomatic, sitting up in chair laughing and joking with daughter.  Dr. Perez notified, no new orders.  Will continue to monitor.

## 2018-01-03 NOTE — PLAN OF CARE
Problem: SLP Goal  Goal: SLP Goal  1. Pt will be able to consume thin liquids in 3-5 mls amounts from a spoon with no signs of airway threat or aspiration.- goal met 1/3/18  2. Ongoing evaluation of swallowing.  3. Consideration of MBS- not likely needed given improvement noted at bedside.    Added Goals:  4. Pt will consume 50-75% of regular consistency diet with thin liquids without overt s/s of aspiration given mod assist.   5. Pt will implement safe swallowing strategies 100% of the time during meals given mod assist.   Outcome: Ongoing (interventions implemented as appropriate)  Continues to show improvement at bedside with PO intake. Observed consuming solids (bread), soft solids (eggs), and thin liquid via spoon and cup today with no overt s/s of aspiration. Recommending upgrade to regular consistency diet (per pt and family request), supplement such as Ensure with meals, and continued SLP services.     Comments: Recommend continued SLP services 4-5x/week for remediation of dysphagia due to baseline dementia with CAP.

## 2018-01-03 NOTE — PLAN OF CARE
Problem: Patient Care Overview  Goal: Plan of Care Review  Outcome: Ongoing (interventions implemented as appropriate)  Performs acapella well. Selected Blue acapella in view of patient age.

## 2018-01-03 NOTE — PLAN OF CARE
Problem: Fall Risk (Adult)  Goal: Absence of Falls  Patient will demonstrate the desired outcomes by discharge/transition of care.   Outcome: Ongoing (interventions implemented as appropriate)  Patient is a fall risk.  Bracelet, socks and bed alarm in place.  No injury/trauma noted or observed.  Assisted OOB to chair with minimal assist and back to bed after family visit.    Problem: Patient Care Overview  Goal: Plan of Care Review  Outcome: Ongoing (interventions implemented as appropriate)  Plan of care reviewed with patient and her daughter during her assessment.  No new questions posed.  Very pleasant patient and family member.      Problem: Pressure Ulcer Risk (Manule Scale) (Adult,Obstetrics,Pediatric)  Goal: Skin Integrity  Patient will demonstrate the desired outcomes by discharge/transition of care.   Outcome: Ongoing (interventions implemented as appropriate)  Patient turning self in bed.  Assisted OOB to chair during the evening for visits with family.  No overt skin breakdown observed.

## 2018-01-03 NOTE — PT/OT/SLP EVAL
"Occupational Therapy   Evaluation    Name: Radha Connell  MRN: [id number]  Admitting Diagnosis:  Community acquired pneumonia      Recommendations:     Discharge Recommendations: nursing facility, skilled  Discharge Equipment Recommendations:  walker, rolling  Barriers to discharge:  Decreased caregiver support (Pt lives within independent senior apartments, currently has increased caregiver burden needs)    History:     Occupational Profile:  Pt lives on first floor of assisted living facility in [place] (she is visiting family in [facility] for the holidays). [facility] is on the second floor with elevator access, although pt prefers to take the stairs. She has a walk-in shower with built in shower chair.   Prior to admission, patients level of function was modified independent for ambulation with rollator. Modified independent for seated bathing on shower chair. Independent with toileting, dressing, feeding. Staff in assisted living assist with cleaning her apartment and meal preparation. Assisted living facility hosts activities that the patient enjoys participating in.  Patient has the following equipment: shower chair, rollator.  DME owned (not currently used): none.  Upon discharge, patient will have assistance from staff at assisted living facility in [place]. From [facility], pt will d/c home with family. Daughter in law lives locally, daughter is here visiting with patient from [place].    Past Medical History:   Diagnosis Date    Annual physical exam 6/22/2011    Cataract     History of mammogram 6/22/2011    Hypertension     Macular degeneration     Osteoporosis     Posterior vitreous detachment of both eyes     Tachycardia     Thyroid disease     hypothyroidism       Past Surgical History:   Procedure Laterality Date    CARDIAC SURGERY         Subjective     Chief Complaint: "I'm tired."  Patient/Family stated goals: return home at [facility]  Communicated with: RN prior to session.  Pain/Comfort:  · Pain Rating " 1: 0/10  · Pain Rating Post-Intervention 1: 0/10    Objective:     Patient found with: peripheral IV, oxygen (2L via NC)    General Precautions: Standard, aspiration, fall   Orthopedic Precautions:N/A   Braces: N/A     Occupational Performance:    Bed Mobility:    · Pt already UIC upon OT arrival     Functional Mobility/Transfers:  · Patient completed Sit <> Stand Transfer with contact guard assistance  with  no assistive device and HHA   · Patient completed Bed <> Chair Transfer using Stand Pivot technique with minimum assistance with no assistive device and HHA   · Pt took x8-10 forward and backward steps from bedside chair with no AD, HHA and min (A)     Activities of Daily Living:  · LB Dressing: minimum assistance incidental (A) to doff/don bilateral socks from seated position in bedside chair using crossed leg postiion; rest breaks provided throughout secondary to increased SOB    Cognitive/Visual Perceptual:  Cognitive/Psychosocial Skills:     -       Oriented to: Person   -       Follows Commands/attention:Follows one-step commands  -       Communication: clear/fluent  -       Safety awareness/insight to disability: impaired   -       Mood/Affect/Coping skills/emotional control: Appropriate to situation  Visual/Perceptual:      -Intact    Physical Exam:  Balance:    -       sitting at edge of bedside chair with SBA; static standing with CGA and dynamic standing with Min (A)  Postural examination/scapula alignment:    -       Rounded shoulders  -       Forward head  -       Posterior pelvic tilt  Skin integrity: Thin and Dry  Edema:  Mild BLE  Sensation:    -       Intact  Motor Planning:    -       intact during functional tasks  Dominant hand:    -       RIght  Upper Extremity Range of Motion:     -       Right Upper Extremity: WFL except limited shoulder AROM secondary to previous injury to (R) shoulder  -       Left Upper Extremity: WFL  Upper Extremity Strength:    -       Right Upper Extremity: WFL  "except shoulder flexion and abduction  -       Left Upper Extremity: WFL    Patient left up in chair with all lines intact, call button in reach, RN notified and daughter present    [facility] 6 Click:  [facility] Total Score: 19    Treatment & Education:  Pt and daughter educated on OT role, POC and D/C recs.   Education:    Assessment:     Radha Connell is a 89 y.o. female with a medical diagnosis of Community acquired pneumonia.  She presents with the following performance deficits: weakness, impaired endurance, impaired self care skills, impaired functional mobilty, gait instability, impaired balance, impaired cognition, decreased upper extremity function, decreased lower extremity function, decreased safety awareness, decreased ROM, impaired cardiopulmonary response to activity.  Pt is currently requiring min (A) for functional mobility at house-hold level using HHA. Pt required min (A) for LB dressing. Pt fatigues easily and becomes SOB with little activity. Pt's SPO2 remained >90% on 2 L of supplemental oxygen, but did require seated rest breaks to reduce her SOB. PTA, pt was living alone within an independent senior apartment. She was mod (I)-independent with all aspects of self-care. Therefore, feel that pt would benefit from SNF to maximize her return to high PLOF before return home.     Rehab Prognosis:  Good ; patient would benefit from acute skilled OT services to address these deficits and reach maximum level of function.         Clinical Decision Makin.  OT Low:  "Pt evaluation falls under low complexity for evaluation coding due to performance deficits noted in 1-3 areas as stated above and 0 co-morbities affecting current functional status. Data obtained from problem focused assessments. No modifications or assistance was required for completion of evaluation. Only brief occupational profile and history review completed."     Plan:     Patient to be seen 5 x/week to address the above listed problems " via self-care/home management, therapeutic activities, therapeutic exercises  · Plan of Care Expires: 02/02/18  · Plan of Care Reviewed with: patient, daughter    This Plan of care has been discussed with the patient who was involved in its development and understands and is in agreement with the identified goals and treatment plan    GOALS:    Occupational Therapy Goals        Problem: Occupational Therapy Goal    Goal Priority Disciplines Outcome Interventions   Occupational Therapy Goal     OT, PT/OT Ongoing (interventions implemented as appropriate)    Description:  Goals to be met by: 2/2/2018     Patient will increase functional independence with ADLs by performing:    UE Dressing with Set-up Assistance.  LE Dressing with Contact Guard Assistance.  Grooming while standing at sink with Stand-by Assistance.  Toileting from bedside commode with Stand-by Assistance for hygiene and clothing management.   Stand pivot transfers with Stand-by Assistance.  Toilet transfer to bedside commode with Stand-by Assistance.  Pt will demonstrate improved endurance as evidence of completing G/H from standing position with no seated rest break and no SOB.   Functional mobility at house-hold level in prep for ADLs using RW with SBA.                       Time Tracking:     OT Date of Treatment: 01/03/18  OT Start Time: 1230  OT Stop Time: 1255  OT Total Time (min): 25 min    Billable Minutes:Evaluation 25    Devorah Orr OTR/L  1/3/2018

## 2018-01-03 NOTE — PLAN OF CARE
-----------------------------------------------------    Luz (daughter) - [phone]  SON(  POA )DAVID # [phone] - HE OK IF YOU CALL SISTER LUZ FOR DC PLANNING ( SISTER LIVES IN [place] )   -----------------------------  ATTN: TEAM DC PLANNING    PLAN A : SNF- CHOICES [facility] SN[facility]  ANS [facility]  AND [facility] - PENDING ACCEPTANCE    NEEDS LOCET - PENDING CALL BACK     NEEDS PSARR , TB SKIN TEST , LAST BM DOC , IMMUNIZATIONS RECORD PER PRIMARY RN MARJ EPIC     PLAN B: RETURN TO DAUGHTER HOME [address] [place] [address]- SHE / LIZBETH STATED NO HEAT@ HER HOUSE NOW  - IFO GIVEN ABOUT  BECKIE MARTINEZ ON SITE # [phone]- TO DAUGHTER       PLAN C:  FAMILY DECLINES  RETURN WITH FAMILY LIVES IN ASSISTIVE LIVING IN [place]   PER SHAMIKA MARTINEZ CM Pt and family are visiting from [place] for [place]. Pt lives in an assisted living facility in [place] and will return there at time of discharge. Family states likely no CM needs for discharge    IF ANY NEED ARISE PLEASE CONTACT RNCM / SSW TEAM    NIRAJ ALVARADO ON CASE ALSO # [id number]     Norma Dominguez RN  Case management   # [phone] (FAX) [phone]     01/03/18 1544   Discharge Assessment   Assessment Type Discharge Planning Reassessment

## 2018-01-03 NOTE — PLAN OF CARE
Problem: Physical Therapy Goal  Goal: Physical Therapy Goal  Goals to be met by: 1/10/18     Patient will increase functional independence with mobility by performin. Sit<>stand transfer with supervision using rolling walker.   2. Gait > 50 feet with supervision using rolling walker.   3. Ascend/descend full flight stairs with unilateral handrails with CGA with or without AD.  4. Activity > 5 minutes with SpO2 > 90% on room air.      Outcome: Revised  PT re-evaluation completed following transfer to ICU 17. Pt has had a decline in status this session when compared to initial PT evaluation 17, most notable decreased endurance, activity tolerance, and gait distance. Will continue to follow and progress as tolerated. Please see progress note for detailed plan of care and recommendations.

## 2018-01-03 NOTE — PLAN OF CARE
01/03/18 1542   Discharge Assessment   Assessment Type Discharge Planning Reassessment   Luz (daughter) - [phone]  SON(  POA )DAVID # [phone] - HE OK IF YOU CALL SISTER LUZ FOR DC PLANNING ( SISTER LIVES IN [place] )   -----------------------------  ATTN: TEAM DC PLANNING     PLAN A : SN[facility]- [facility] - PENDING ACCEPTANCE     PLAN B: RETURN TO DAUGHTER HOME [address] [place] [address]- SHE / LIZBETH STATED NO HEAT@ HER HOUSE NOW  - IFO GIVEN ABOUT  BECKIE MARTINEZ ON SITE # [phone]- TO DAUGHTER       PLAN C:  FAMILY DECLINES  RETURN WITH FAMILY LIVES IN ASSISTIVE LIVING IN [place]   PER SHAMIKA MARTINEZ CM Pt and family are visiting from [place] for [place]. Pt lives in an assisted living facility in [place] and will return there at time of discharge. Family states likely no CM needs for discharge     IF ANY NEED ARISE PLEASE CONTACT RNCM / SSW TEAM    NIRAJ ALVARADO ON CASE ALSO # [id number]     Norma Dominguez RN  Case management   # [phone] (FAX) [phone]

## 2018-01-03 NOTE — PT/OT/SLP PROGRESS
Speech Language Pathology Treatment    Patient Name:  Radha Connell   MRN:  [id number]  Admitting Diagnosis: Community acquired pneumonia    Recommendations:                 General Recommendations:  Dysphagia therapy  Diet recommendations:  Dental Soft, Regular, Liquid Diet Level: Thin (spoon or small cup sips only)   Aspiration Precautions: 1 bite/sip at a time, Alternating bites/sips, Assistance with meals, Avoid talking while eating, Eliminate distractions, Feed only when awake/alert, HOB to 90 degrees, Meds whole buried in puree, No straws and Remain upright 30 minutes post meal   General Precautions: Standard, aspiration, respiratory      Subjective     Pt awake, alert, and cooperative.   Patient goals: Pt reports wanting pancakes.    Pain/Comfort:  · Pain Rating 1: 0/10  · Pain Rating Post-Intervention 1: 0/10    Objective:     Has the patient been evaluated by SLP for swallowing?   Yes  Keep patient NPO? No   Current Respiratory Status: nasal cannula      COGNITION: Localizes to name consistently. Cues needed to sustain attention to task over time in a quiet environment. Easily distracted. Demonstrated orientation to name. No recall of information related to safe swallowing dicussed during previous session. Frequent cues needed. Answered questions 100% of the time, though not always appropriately. Dcr hearing acuity. Verbal expression is fluent. Expressing basic wants and needs.     SWALLOWING: Breakfast tray noted at bedside with <10% consumed. Pt reports feeling full and not liking the taste of the food on the tray. Eating bread given by family. Observed consuming hard solids (bread), soft solids (eggs) and thin liquid via spoon and cup. Self fed with assist for set up and cues for initiation. ORAL PHASE: Lip seal WNL. Slow bolus formation and A-P transit. No oral residue seen after the swallow. PHARYNGEAL PHASE: No overt s/s of airway penetration and/or aspiration noted during session today given cues for  safe swallowing strategies, showing improvement. No coughing, choking, throat clearing, or change in vocal quality. Possible s/s of pharyngeal residue characterized by multiple swallows per bolus. Nursing reports coughing this morning during meds given multiple meds at once with thin liquid robby. Advised providing meds in puree 1 at a time. Family and nursing agreed.     Assessment:     Radha Connell is a 89 y.o. female with an SLP diagnosis of Dysphagia and Cognitive-Linguistic Impairment.  She presents with improvement today with no s/s of aspiration noted during consumption of solids and liquids with safe swallowing strategies.    Goals:    SLP Goals        Problem: SLP Goal    Goal Priority Disciplines Outcome   SLP Goal     SLP Ongoing (interventions implemented as appropriate)   Description:  1. Pt will be able to consume thin liquids in 3-5 mls amounts from a spoon with no signs of airway threat or aspiration.- goal met 1/3/18  2. Ongoing evaluation of swallowing.  3. Consideration of MBS- not likely needed given improvement noted at bedside.    Added Goals:  4. Pt will consume 50-75% of regular consistency diet with thin liquids without overt s/s of aspiration given mod assist.   5. Pt will implement safe swallowing strategies 100% of the time during meals given mod assist.                     Plan:     · Patient to be seen:  5 x/week   · Plan of Care expires:  01/31/18  · Plan of Care reviewed with:  patient, daughter (MD)   · SLP Follow-Up:  Yes       Discharge recommendations:   (Continue SLP services at next level of care. )        Time Tracking:     SLP Treatment Date:   01/03/18  Speech Start Time:  0847  Speech Stop Time:  0905     Speech Total Time (min):  18 min    Billable Minutes: Treatment Swallowing Dysfunction 18 minutes    Han Chin CCC-SLP  01/03/2018

## 2018-01-03 NOTE — PT/OT/SLP RE-EVAL
Physical Therapy Re-evaluation and Treatment    Patient Name:  Radha Connell   MRN:  [id number]   B325/B325 A      Recommendations:     Discharge Recommendations:  nursing facility, skilled   Discharge Equipment Recommendations: none   Barriers to discharge: Unable to tolerate return to assisted living facility in [place] at this time    Assessment:     Radha Connell is a 89 y.o. female admitted with a medical diagnosis of Community acquired pneumonia.  She presents with the following impairments/functional limitations:  weakness, impaired endurance, impaired self care skills, impaired functional mobilty, impaired balance, gait instability, decreased safety awareness, impaired cognition, impaired cardiopulmonary response to activity. PT re-evaluation completed following transfer to ICU 12/31/17. Pt has had a decline in status this session when compared to initial PT evaluation 12/30/17, most notable decreased endurance, activity tolerance, and gait distance. AMPAC score has decreased from 18 to 15. Will continue to follow and progress as tolerated. Prior to admission pt was modified independent with mobility (ambulation with walker) and self-care (independent with dressing and toileting) and there is expectation of returning to prior level of function to maintain independence avoiding readmission for return to assisted living facility in [place]. Pt is at high risk of unplanned readmission due to fall risk.      Rehab Prognosis:  Good; patient would benefit from acute skilled PT services to address these deficits and reach maximum level of function.      Recent Surgery: * No surgery found *      Plan:     During this hospitalization, patient to be seen 6 x/week to address the above listed problems via gait training, therapeutic activities, therapeutic exercises, neuromuscular re-education  · Plan of Care Expires:  01/29/18   Plan of Care Reviewed with: patient, daughter    Subjective     Communicated with nurse prior to  "session.  Patient found R side lying upon PT entry to room, agreeable to evaluation.      Chief Complaint: SOB, fatigue  Patient comments/goals: "I'm pooped."  Pain/Comfort:  · Pain Rating 1: 0/10  · Pain Rating Post-Intervention 1: 0/10    Patients cultural, spiritual, Temple conflicts given the current situation: none specified      Objective:     Patient found with: oxygen (2L/min via nasal cannula)     General Precautions: Standard, aspiration, fall (ambulate with assist)   Orthopedic Precautions:N/A   Braces: N/A     Exams:  · Cognition: Patient is oriented to Person and partial to place, partial to time and follows approximately 100% of one step commands.    · Posture:    · -       Rounded shoulders  · -       Forward head  · -       Kyphosis  · Sensation: Intact to light touch bilateral LEs.  · Skin Integrity: Visible skin intact  · Edema: None noted   · Coordination: No coordination impairments identified with functional mobility. No formal testing performed.   · LE ROM/Strength: Grossly 4/5 bilateral LEs  · Tone: No tone impairments identified during functional mobility.   · Vital signs: SpO2: 92% on 2 L/min; Heart rate 73 bpm with light activity.   · Wet productive cough noted during session.      Functional Mobility:  · Bed Mobility:     · Supine to Sit: contact guard assistance and HOB elevated  · Transfers:     · Sit to Stand:  contact guard assistance with rolling walker and x 1 from edge of bed, x 1 frmo BSC  · Gait: x 10 ft (seated break for voiding on BS) x 12 ft on level tile with rolling walker and intermittent min A at trunk and for walker management. Verbal cues for safety. Standing rest breaks required with verbal cues for breathing technique.   · Balance: CGA with bilateral UE support for dynamic stand    AM-PAC 6 CLICK MOBILITY  Total Score:15       Therapeutic Activities and Exercises:   Max A for donning underwear (initated in sitting) as well as underwear management once at [facility]. Min A " at trunk while patient performed standing leighann-care after voiding on BSC, min A for standing hand hygiene. Up in chair at end session.     Patient left up in chair with all lines intact, call button in reach, nurse notified and daughter present.    GOALS:    Physical Therapy Goals        Problem: Physical Therapy Goal    Goal Priority Disciplines Outcome Goal Variances Interventions   Physical Therapy Goal     PT/OT, PT Revised     Description:  Goals to be met by: 1/10/18     Patient will increase functional independence with mobility by performin. Sit<>stand transfer with supervision using rolling walker.   2. Gait > 50 feet with supervision using rolling walker.   3. Ascend/descend full flight stairs with unilateral handrails with CGA with or without AD.  4. Activity > 5 minutes with SpO2 > 90% on room air.                  Multidisciplinary Problems (Resolved)        Problem: Physical Therapy Goal    Goal Priority Disciplines Outcome Goal Variances Interventions   Physical Therapy Goal   (Resolved)     PT/OT, PT  Error                     History:     Past Medical History:   Diagnosis Date    Annual physical exam 2011    Cataract     History of mammogram 2011    Hypertension     Macular degeneration     Osteoporosis     Posterior vitreous detachment of both eyes     Tachycardia     Thyroid disease     hypothyroidism       Past Surgical History:   Procedure Laterality Date    CARDIAC SURGERY         Clinical Decision Making:     History  Co-morbidities and personal factors that may impact the plan of care Examination  Body Structures and Functions, activity limitations and participation restrictions that may impact the plan of care Clinical Presentation   Decision Making/ Complexity Score   Co-morbidities:   [] Time since onset of injury / illness / exacerbation  [] Status of current condition  []Patient's cognitive status and safety concerns    [] Multiple Medical Problems  (see med hx)  Personal Factors:   [] Patient's age  [] Prior Level of function   [] Patient's home situation (environment and family support)  [] Patient's level of motivation  [] Expected progression of patient      HISTORY:(criteria)    [] 27625 - no personal factors/history    [] 51207 - has 1-2 personal factor/comorbidity     [] 02263 - has >3 personal factor/comorbidity     Body Regions:  [] Objective examination findings  [] Head     []  Neck  [] Trunk   [] Upper Extremity  [] Lower Extremity    Body Systems:  [] For communication ability, affect, cognition, language, and learning style: the assessment of the ability to make needs known, consciousness, orientation (person, place, and time), expected emotional /behavioral responses, and learning preferences (eg, learning barriers, education  needs)  [] For the neuromuscular system: a general assessment of gross coordinated movement (eg, balance, gait, locomotion, transfers, and transitions) and motor function  (motor control and motor learning)  [] For the musculoskeletal system: the assessment of gross symmetry, gross range of motion, gross strength, height, and weight  [] For the integumentary system: the assessment of pliability(texture), presence of scar formation, skin color, and skin integrity  [] For cardiovascular/pulmonary system: the assessment of heart rate, respiratory rate, blood pressure, and edema     Activity limitations:    [] Patient's cognitive status and saf ety concerns          [] Status of current condition      [] Weight bearing restriction  [] Cardiopulmunary Restriction    Participation Restrictions:   [] Goals and goal agreement with the patient     [] Rehab potential (prognosis) and probable outcome      Examination of Body System: (criteria)    [] 16717 - addressing 1-2 elements    [] 92783 - addressing a total of 3 or more elements     [] 27656 -  Addressing a total of 4 or more elements         Clinical Presentation: (criteria)   Choose one     On examination of body system using standardized tests and measures patient presents with (CHOOSE ONE) elements from any of the following: body structures and functions, activity limitations, and/or participation restrictions.  Leading to a clinical presentation that is considered (CHOOSE ONE)                              Clinical Decision Making  (Eval Complexity):  Choose One     Time Tracking:     PT Received On: 01/03/18  PT Start Time: 1102     PT Stop Time: 1144  PT Total Time (min): 42 min     Billable Minutes: Re-eval 15, Gait Training 12 and Therapeutic Activity 12      Luz Marina Robbins, PT  01/03/2018

## 2018-01-04 PROBLEM — N30.00 ACUTE CYSTITIS WITHOUT HEMATURIA: Status: RESOLVED | Noted: 2017-12-30 | Resolved: 2018-01-04

## 2018-01-04 LAB
ANION GAP SERPL CALC-SCNC: 6 MMOL/L
ANISOCYTOSIS BLD QL SMEAR: SLIGHT
BASO STIPL BLD QL SMEAR: ABNORMAL
BASOPHILS # BLD AUTO: ABNORMAL K/UL
BASOPHILS NFR BLD: 0 %
BUN SERPL-MCNC: 19 MG/DL
CALCIUM SERPL-MCNC: 8.2 MG/DL
CHLORIDE SERPL-SCNC: 95 MMOL/L
CO2 SERPL-SCNC: 25 MMOL/L
CREAT SERPL-MCNC: 0.8 MG/DL
DIFFERENTIAL METHOD: ABNORMAL
EOSINOPHIL # BLD AUTO: ABNORMAL K/UL
EOSINOPHIL NFR BLD: 1 %
ERYTHROCYTE [DISTWIDTH] IN BLOOD BY AUTOMATED COUNT: 15.5 %
EST. GFR  (AFRICAN AMERICAN): >60 ML/MIN/1.73 M^2
EST. GFR  (NON AFRICAN AMERICAN): >60 ML/MIN/1.73 M^2
GIANT PLATELETS BLD QL SMEAR: PRESENT
GLUCOSE SERPL-MCNC: 81 MG/DL
HCT VFR BLD AUTO: 32.6 %
HGB BLD-MCNC: 10.8 G/DL
HYPOCHROMIA BLD QL SMEAR: ABNORMAL
LYMPHOCYTES # BLD AUTO: ABNORMAL K/UL
LYMPHOCYTES NFR BLD: 8 %
MCH RBC QN AUTO: 32.6 PG
MCHC RBC AUTO-ENTMCNC: 33.1 G/DL
MCV RBC AUTO: 99 FL
MONOCYTES # BLD AUTO: ABNORMAL K/UL
MONOCYTES NFR BLD: 2 %
MYELOCYTES NFR BLD MANUAL: 1 %
NEUTROPHILS NFR BLD: 87 %
NEUTS BAND NFR BLD MANUAL: 1 %
OVALOCYTES BLD QL SMEAR: ABNORMAL
PLATELET # BLD AUTO: 240 K/UL
PLATELET BLD QL SMEAR: ABNORMAL
PMV BLD AUTO: 11.6 FL
POIKILOCYTOSIS BLD QL SMEAR: SLIGHT
POLYCHROMASIA BLD QL SMEAR: ABNORMAL
POTASSIUM SERPL-SCNC: 3.9 MMOL/L
RBC # BLD AUTO: 3.31 M/UL
SCHISTOCYTES BLD QL SMEAR: PRESENT
SODIUM SERPL-SCNC: 126 MMOL/L
WBC # BLD AUTO: 7.35 K/UL

## 2018-01-04 PROCEDURE — 25000003 PHARM REV CODE 250: Performed by: HOSPITALIST

## 2018-01-04 PROCEDURE — 11000001 HC ACUTE MED/SURG PRIVATE ROOM

## 2018-01-04 PROCEDURE — 80048 BASIC METABOLIC PNL TOTAL CA: CPT

## 2018-01-04 PROCEDURE — 97110 THERAPEUTIC EXERCISES: CPT

## 2018-01-04 PROCEDURE — 97530 THERAPEUTIC ACTIVITIES: CPT

## 2018-01-04 PROCEDURE — 36415 COLL VENOUS BLD VENIPUNCTURE: CPT

## 2018-01-04 PROCEDURE — 27000173 HC ACAPELLA DEVICE DH OR DM

## 2018-01-04 PROCEDURE — 99233 SBSQ HOSP IP/OBS HIGH 50: CPT | Mod: ,,, | Performed by: HOSPITALIST

## 2018-01-04 PROCEDURE — 85007 BL SMEAR W/DIFF WBC COUNT: CPT

## 2018-01-04 PROCEDURE — 63600175 PHARM REV CODE 636 W HCPCS: Performed by: HOSPITALIST

## 2018-01-04 PROCEDURE — 85027 COMPLETE CBC AUTOMATED: CPT

## 2018-01-04 PROCEDURE — 94664 DEMO&/EVAL PT USE INHALER: CPT

## 2018-01-04 PROCEDURE — 99900035 HC TECH TIME PER 15 MIN (STAT)

## 2018-01-04 PROCEDURE — 25000003 PHARM REV CODE 250: Performed by: INTERNAL MEDICINE

## 2018-01-04 RX ORDER — VIT C/E/ZN/COPPR/LUTEIN/ZEAXAN 250MG-90MG
1000 CAPSULE ORAL DAILY
Refills: 0 | COMMUNITY
Start: 2018-01-04

## 2018-01-04 RX ORDER — DOXYCYCLINE HYCLATE 100 MG
100 TABLET ORAL EVERY 12 HOURS
Qty: 14 TABLET | Refills: 0
Start: 2018-01-04 | End: 2018-01-11

## 2018-01-04 RX ADMIN — PREDNISONE 20 MG: 20 TABLET ORAL at 10:01

## 2018-01-04 RX ADMIN — DOXYCYCLINE HYCLATE 100 MG: 100 TABLET, COATED ORAL at 10:01

## 2018-01-04 RX ADMIN — OSELTAMIVIR PHOSPHATE 75 MG: 75 CAPSULE ORAL at 10:01

## 2018-01-04 RX ADMIN — DOXYCYCLINE HYCLATE 100 MG: 100 TABLET, COATED ORAL at 08:01

## 2018-01-04 RX ADMIN — ENOXAPARIN SODIUM 30 MG: 100 INJECTION SUBCUTANEOUS at 06:01

## 2018-01-04 RX ADMIN — BENAZEPRIL HYDROCHLORIDE 5 MG: 5 TABLET, COATED ORAL at 10:01

## 2018-01-04 RX ADMIN — VERAPAMIL HYDROCHLORIDE 240 MG: 120 TABLET, FILM COATED, EXTENDED RELEASE ORAL at 10:01

## 2018-01-04 NOTE — PLAN OF CARE
Problem: Patient Care Overview  Goal: Plan of Care Review  Outcome: Ongoing (interventions implemented as appropriate)  No injuries. Plan of care reviewed with patient and family. Verbalized understanding. Vitals stable. Will continue to monitor.

## 2018-01-04 NOTE — PLAN OF CARE
[facility]     Department of Hospital Medicine     [address] [place] [address]     [phone] after hours  [phone] fax       NURSING HOME ORDERS    01/05/2018    Admit to Nursing Home:    Skilled Bed                                                  Diagnoses:  Active Hospital Problems    Diagnosis  POA    *Community acquired pneumonia [J18.9]  Yes    Acute cystitis without hematuria [N30.00]  Yes    Hyponatremia [E87.1]  Yes    Anemia, macrocytic [D53.9]  Yes    Essential hypertension [I10]  Yes      Resolved Hospital Problems    Diagnosis Date Resolved POA    Respiratory distress [R06.00] 01/03/2018 Yes       Patient is homebound due to:  Community acquired pneumonia    Allergies:  Review of patient's allergies indicates:   Allergen Reactions    Penicillins Rash    Sulfa (sulfonamide antibiotics) Rash       Vitals:       Every shift (Skilled Nursing patients)    Diet: Mechanical soft diet with dietary supplement of patient's choice with each meal    Activities:   As tolerated     LABS:  Per facility protocol    Nursing Precautions:     - Aspiration precautions:             -  Upright 90 degrees before during and after meals             -  Suction at bedside      -  No straws       - Fall precautions per nursing home protocol   - Decubitus precautions:        -  for positioning   - Pressure reducing foam mattress   - Turn patient every two hours. Use wedge pillows to anchor patient    CONSULTS:      Physical Therapy to evaluate and treat     Occupational Therapy to evaluate and treat    Medications: Discontinue all previous medication orders, if any. See new list below.   Radha Connell   Home Medication Instructions ITALO:[id number]    Printed on:01/04/18 2439   Medication Information                      cholecalciferol, vitamin D3, 1,000 unit capsule  Take 1 capsule (1,000 Units total) by mouth once daily.             doxycycline  (VIBRA-TABS) 100 MG tablet  Take 1 tablet (100 mg total) by mouth every 12 (twelve) hours through 1/11/2018.                     _________________________________  Charlotte Chacko MD  01/05/2018

## 2018-01-04 NOTE — PT/OT/SLP PROGRESS
Occupational Therapy   Treatment    Name: Radha Connell  MRN: [id number]  Admitting Diagnosis:  Community acquired pneumonia       Recommendations:     Discharge Recommendations: nursing facility, skilled  Discharge Equipment Recommendations:  walker, rolling  Barriers to discharge:  Decreased caregiver support    Subjective     Communicated with: nurse prior to session.  Pain/Comfort:  · Pain Rating 1: 0/10  · Pain Rating Post-Intervention 1: 0/10    Objective:     Patient found with: bed alarm, oxygen (JACQUELYN pt safety monitor system)    General Precautions: Standard, fall, aspiration   Orthopedic Precautions:N/A   Braces: N/A     Occupational Performance:    Bed Mobility:    · Patient completed Scooting/Bridging with contact guard assistance  · Patient completed Supine to Sit with contact guard assistance     Functional Mobility/Transfers:  · Patient completed Sit <> Stand Transfer with contact guard assistance  with  rolling walker   · Patient completed Bed <> Chair Transfer using Step Transfer technique with contact guard assistance with rolling walker    Activities of Daily Living:  · Grooming: attempted to ambulateto sink but c/o nausea and had to sit back down on bed unable to continue to sink 2/2 nausea per pt complaint  · Toileting: declined 2/2 stated already done prior to OT visit    Patient left up in chair with all lines intact, call button in reach, nurse notified and daughter present    [facility] 6 Click:  [facility] Total Score: 19    Treatment & Education:  See above.   Education:    Assessment:     Radha Connell is a 89 y.o. female with a medical diagnosis of Community acquired pneumonia.  She presents with limited participation 2/2 nausea and nurse notified.  Session ended as pt could not participate any longer per her report.  Performance deficits affecting function are weakness, impaired functional mobilty, impaired balance, impaired cardiopulmonary response to activity, gait instability, impaired self care  skills, impaired endurance, decreased safety awareness, impaired cognition.      Rehab Prognosis:  good; patient would benefit from acute skilled OT services to address these deficits and reach maximum level of function.       Plan:     Patient to be seen 5 x/week to address the above listed problems via self-care/home management, therapeutic activities, therapeutic exercises  · Plan of Care Expires: 02/02/18  · Plan of Care Reviewed with: patient, daughter    This Plan of care has been discussed with the patient who was involved in its development and understands and is in agreement with the identified goals and treatment plan    GOALS:    Occupational Therapy Goals        Problem: Occupational Therapy Goal    Goal Priority Disciplines Outcome Interventions   Occupational Therapy Goal     OT, PT/OT Ongoing (interventions implemented as appropriate)    Description:  Goals to be met by: 2/2/2018     Patient will increase functional independence with ADLs by performing:    UE Dressing with Set-up Assistance.  LE Dressing with Contact Guard Assistance.  Grooming while standing at sink with Stand-by Assistance.  Toileting from bedside commode with Stand-by Assistance for hygiene and clothing management.   Stand pivot transfers with Stand-by Assistance.  Toilet transfer to bedside commode with Stand-by Assistance.  Pt will demonstrate improved endurance as evidence of completing G/H from standing position with no seated rest break and no SOB.   Functional mobility at house-hold level in prep for ADLs using RW with SBA.                       Time Tracking:     OT Date of Treatment: 01/04/18  OT Start Time: 1122  OT Stop Time: 1136  OT Total Time (min): 14 min    Billable Minutes:Therapeutic Activity 14  Total Time 14    Nan Tejada OT  1/4/2018

## 2018-01-04 NOTE — PLAN OF CARE
Luz (daughter) - [phone]  SON(  POA )DAVID #  - HE OK IF YOU CALL SISTER LUZ FOR DC PLANNING ( SISTER [facility]IV[facility] IN [facility] )   -----------------------------  ATTN: TEAM DC PLANNING    PLAN A : SNF-     [facility] SNF -Accepted PER [facility]   DAUGHTER TO COME IN AM TO PAPER WORK - UNABLE TO TAKE PT UNTIL AM 1/5    PLAN B: RETURN TO DAUGHTER HOME [address] [place] [address]- SHE / LIZBETH STATED NO HEAT@ HER HOUSE NOW  - IFO GIVEN ABOUT  BECKIE MARTINEZ ON SITE # [phone]- TO DAUGHTER     IF ANY NEED ARISE PLEASE CONTACT RNCM / SSW TEAM    NIRAJ ALVARADO ON CASE ALSO # [id number]     Norma Dominguez RN  Case management   # [phone] (FAX) [phone]     01/04/18 1525   Discharge Assessment   Assessment Type Discharge Planning Reassessment

## 2018-01-04 NOTE — SUBJECTIVE & OBJECTIVE
Interval History:  Much better today, transferred to the floor and participating in therapy.  Daughter at bedside.      Review of Systems   Constitutional: Positive for fatigue. Negative for chills and fever.   Respiratory: Positive for cough and shortness of breath.    Cardiovascular: Negative for chest pain and palpitations.     Objective:     Vital Signs (Most Recent):  Temp: 97.9 °F (36.6 °C) (01/03/18 1912)  Pulse: 65 (01/03/18 1912)  Resp: 16 (01/03/18 1912)  BP: (!) 101/58 (01/03/18 1912)  SpO2: 97 % (01/03/18 1912) Vital Signs (24h Range):  Temp:  [97.2 °F (36.2 °C)-98.4 °F (36.9 °C)] 97.9 °F (36.6 °C)  Pulse:  [64-73] 65  Resp:  [16-18] 16  SpO2:  [93 %-98 %] 97 %  BP: ()/(51-59) 101/58     Weight: 63.7 kg (140 lb 6.9 oz)  Body mass index is 27.43 kg/m².    Intake/Output Summary (Last 24 hours) at 01/03/18 1925  Last data filed at 01/02/18 2025   Gross per 24 hour   Intake               50 ml   Output                0 ml   Net               50 ml      Physical Exam   Constitutional: She is oriented to person, place, and time. She appears well-developed and well-nourished.   Frail, elderly woman sitting up in a chair.   HENT:   Head: Normocephalic.   Eyes: Conjunctivae are normal. Pupils are equal, round, and reactive to light.   Neck: Neck supple. No thyromegaly present.   Cardiovascular: Normal rate, regular rhythm and intact distal pulses.  Exam reveals no gallop and no friction rub.    Murmur heard.  Pulmonary/Chest: Effort normal. She has no wheezes. She has no rales.   Occasional cough.   Abdominal: Soft. Bowel sounds are normal. She exhibits no distension. There is no tenderness.   Musculoskeletal: Normal range of motion. She exhibits no edema.   Lymphadenopathy:     She has no cervical adenopathy.   Neurological: She is alert and oriented to person, place, and time.   Skin: Skin is warm and dry. No rash noted.   Psychiatric: She has a normal mood and affect. Her behavior is normal.        Significant Labs: All pertinent labs within the past 24 hours have been reviewed.    Significant Imaging: I have reviewed all pertinent imaging results/findings within the past 24 hours.

## 2018-01-04 NOTE — PLAN OF CARE
Problem: Occupational Therapy Goal  Goal: Occupational Therapy Goal  Goals to be met by: 2/2/2018     Patient will increase functional independence with ADLs by performing:    UE Dressing with Set-up Assistance.  LE Dressing with Contact Guard Assistance.  Grooming while standing at sink with Stand-by Assistance.  Toileting from bedside commode with Stand-by Assistance for hygiene and clothing management.   Stand pivot transfers with Stand-by Assistance.  Toilet transfer to bedside commode with Stand-by Assistance.  Pt will demonstrate improved endurance as evidence of completing G/H from standing position with no seated rest break and no SOB.   Functional mobility at house-hold level in prep for ADLs using RW with SBA.      Outcome: Ongoing (interventions implemented as appropriate)  Continue with OT POC.

## 2018-01-04 NOTE — ASSESSMENT & PLAN NOTE
- Leukocytosis with bibasilar opacities on CXR on admission  - Completed 5 day course of azithromycin.  Ceftriaxone changed to oral doxycycline given PCN allergy.  - Blood and sputum cultures show no growth to date.   - Continue bronchodilators and a capella device.

## 2018-01-04 NOTE — PLAN OF CARE
Luz (daughter) - [phone]  SON(  POA )DAVID # [phone] - HE OK IF YOU CALL SISTER LUZ FOR DC PLANNING ( SISTER LIVES IN [facility] )   -----------------------------  ATTN: TEAM DC PLANNING     PLAN A : SNF- CHOICES   [facility] -Accepted   [facility]  Accepted CALLED BACK DENIED   [facility] - PENDING ACCEPTANCE     NEEDS LOCET - PENDING CALL BACK - called several times     NEEDS PSARR , TB SKIN TEST done , LAST BM DOC  Notified primary rn , IMMUNIZATIONS RECORD  Notified primary rn PER PRIMARY RN IN EPIC     PLAN B: [address]UGHTER [address] [facility] [address]- SHE / LIZBETH STATED NO HEAT@ HER HOUSE NOW  - IFO GIVEN ABOUT  BECKIE MARTINEZ ON SITE # [phone]- TO DAUGHTER      IF ANY NEED ARISE PLEASE CONTACT RNCM / SSW TEAM    NIRAJ ALVARADO ON CASE ALSO # [id number]     Norma Dominguez RN  Case management   # [phone] (FAX) [phone]     01/04/18 4932   Discharge Assessment   Assessment Type Discharge Planning Reassessment

## 2018-01-04 NOTE — PLAN OF CARE
Problem: Physical Therapy Goal  Goal: Physical Therapy Goal  Goals to be met by: 1/10/18     Patient will increase functional independence with mobility by performin. Sit<>stand transfer with supervision using rolling walker.   2. Gait > 50 feet with supervision using rolling walker.   3. Ascend/descend full flight stairs with unilateral handrails with CGA with or without AD.  4. Activity > 5 minutes with SpO2 > 90% on room air.       Outcome: Ongoing (interventions implemented as appropriate)  Pt progressing with PT. Up in chair at end session, advanced LE exercise program. Will continue to follow and progress as tolerated. Please see progress note for detailed plan of care and recommendations (SNF).

## 2018-01-04 NOTE — PLAN OF CARE
Problem: Patient Care Overview  Goal: Plan of Care Review  Outcome: Ongoing (interventions implemented as appropriate)  Pt free of fall or injury during shift.  Denies pain or SOB.  VSS on 2L NC.  Positions self independently without difficulty.  Up to toilet or BSC with one assist and walker.  SLP recs followed.  Purposeful rounding performed and safety maintained.  Family remains at bedside throughout shift.  Pt resting comfortably in bed, no complaints.

## 2018-01-04 NOTE — PROGRESS NOTES
[facility] Hospital Medicine  Progress Note    Patient Name: Radha Connell  MRN: [id number]  Patient Class: IP- Inpatient   Admission Date: 12/29/2017  Length of Stay: 5 days  Attending Physician: Charlotte Perez MD  Primary Care Provider: Ivelisse Cerrato MD        Subjective:     Principal Problem:Community acquired pneumonia    HPI:  Ms. Connell is a 89 year old woman who presented to the ED with wheezing, shortness of breath and cough for one day.  Family reported she had been anorexic, tired and weak for the preceding 3 days.  She did not have fever, chills, GI or urinary tract symptoms.      Patient's medical history is notable for HTN and dementia.  She does not have a history of lung disease and is a lifelong non-smoker.  She is a patient of Dr. Cerrato but most recent office note is from 2014.       ED evaluation showed WBC 15.94, Na 128; CXR showed bibasilar opacities    Admitted for further evaluation and treatment.    Hospital Course:  Patient was admitted on IV antibiotics for presumed CAP.  Her wheezing and overall condition improved, but she continued to have episodes of respiratory distress associated with panicking, eventually was transferred to the ICU on 1/1 due to one of these episodes.  She was seen by Speech therapy for possible aspiration, and a modified barium swallow study was recommended initially but she actually passed a bedside swallow study when she was feeling better.  SNF was recommended.    Interval History:  Much better today, transferred to the floor and participating in therapy.  Daughter at bedside.      Review of Systems   Constitutional: Positive for fatigue. Negative for chills and fever.   Respiratory: Positive for cough and shortness of breath.    Cardiovascular: Negative for chest pain and palpitations.     Objective:     Vital Signs (Most Recent):  Temp: 97.9 °F (36.6 °C) (01/03/18 1912)  Pulse: 65 (01/03/18 1912)  Resp: 16 (01/03/18 1912)  BP: (!) 101/58  (01/03/18 1912)  SpO2: 97 % (01/03/18 1912) Vital Signs (24h Range):  Temp:  [97.2 °F (36.2 °C)-98.4 °F (36.9 °C)] 97.9 °F (36.6 °C)  Pulse:  [64-73] 65  Resp:  [16-18] 16  SpO2:  [93 %-98 %] 97 %  BP: ()/(51-59) 101/58     Weight: 63.7 kg (140 lb 6.9 oz)  Body mass index is 27.43 kg/m².    Intake/Output Summary (Last 24 hours) at 01/03/18 1925  Last data filed at 01/02/18 2025   Gross per 24 hour   Intake               50 ml   Output                0 ml   Net               50 ml      Physical Exam   Constitutional: She is oriented to person, place, and time. She appears well-developed and well-nourished.   Frail, elderly woman sitting up in a chair.   HENT:   Head: Normocephalic.   Eyes: Conjunctivae are normal. Pupils are equal, round, and reactive to light.   Neck: Neck supple. No thyromegaly present.   Cardiovascular: Normal rate, regular rhythm and intact distal pulses.  Exam reveals no gallop and no friction rub.    Murmur heard.  Pulmonary/Chest: Effort normal. She has no wheezes. She has no rales.   Occasional cough.   Abdominal: Soft. Bowel sounds are normal. She exhibits no distension. There is no tenderness.   Musculoskeletal: Normal range of motion. She exhibits no edema.   Lymphadenopathy:     She has no cervical adenopathy.   Neurological: She is alert and oriented to person, place, and time.   Skin: Skin is warm and dry. No rash noted.   Psychiatric: She has a normal mood and affect. Her behavior is normal.       Significant Labs: All pertinent labs within the past 24 hours have been reviewed.    Significant Imaging: I have reviewed all pertinent imaging results/findings within the past 24 hours.    Assessment/Plan:      * Community acquired pneumonia    - Leukocytosis with bibasilar opacities on CXR on admission  - Completed 5 day course of azithromycin.  Ceftriaxone changed to oral doxycycline given PCN allergy.  - Blood and sputum cultures show no growth to date.   - Continue bronchodilators  and a capella device.            Acute cystitis without hematuria    - Urine culture showed no growth.  - Completed course of Rocephin in any case.            Anemia, macrocytic    - Worked up in the past and cause not found  - Normal iron, B12 and folic acid.            Thrombocytopenia    - no evidence of bleeding  - monitor          Hyponatremia    - History of chronic hyponatremia, could be related to PNA vs volume depletion from poor oral intake  - IV fluids discontinued  - Urine sodium appropriately low              Essential hypertension    - Continue current medication and monitor for hypotension.          VTE Risk Mitigation         Ordered     enoxaparin injection 30 mg  Daily     Route:  Subcutaneous        12/30/17 1428     Medium Risk of VTE  Once      12/29/17 1344              Charlotte Chacko MD  Department of Hospital Medicine   [facility], [place], [facility]

## 2018-01-04 NOTE — PT/OT/SLP PROGRESS
"Physical Therapy Treatment    Patient Name:  Radha Connell   MRN:  [id number]    Recommendations:     Discharge Recommendations:  nursing facility, skilled   Discharge Equipment Recommendations: none   Barriers to discharge: Decreased caregiver support    Assessment:     Radha Connell is a 89 y.o. female admitted with a medical diagnosis of Community acquired pneumonia.  She presents with the following impairments/functional limitations:  weakness, impaired endurance, impaired self care skills, impaired functional mobilty. Pt progressing with PT. Up in chair at end session, advanced LE exercise program. SpO2 on 1.5L/min 95% with light activity. Will continue to follow and progress as tolerated.     Rehab Prognosis:  Good; patient would benefit from acute skilled PT services to address these deficits and reach maximum level of function.      Recent Surgery: * No surgery found *      Plan:     During this hospitalization, patient to be seen 6 x/week to address the above listed problems via gait training, therapeutic activities, therapeutic exercises, neuromuscular re-education  · Plan of Care Expires:  01/29/18   Plan of Care Reviewed with: patient, daughter    Subjective     Communicated with nurse prior to session.  Patient found supine in bed with daughter present upon PT entry to room, agreeable to treatment.      Chief Complaint: feeling weak  Patient comments/goals: "I don't even know if I can stand up."  Pain/Comfort:  · Pain Rating 1: 0/10  · Pain Rating Post-Intervention 1: 0/10    Patients cultural, spiritual, Restorationist conflicts given the current situation: none specified    Objective:     Patient found with: oxygen (1.5 L/min via nasal cannula)     General Precautions: Standard,  (DNR, fall risk, aspiration risk)   Orthopedic Precautions:N/A   Braces:   NA    Functional Mobility:  · Bed Mobility:     · Supine to Sit: moderate assistance and at trunk with HOB elevated  · Transfers:     · Sit to Stand:  " contact guard assistance with rolling walker  · Gait: x 8 ft to bedside chair with CGA and rolling walker, verbal cues for safety and navigation      AM-PAC 6 CLICK MOBILITY  Turning over in bed (including adjusting bedclothes, sheets and blankets)?: 3  Sitting down on and standing up from a chair with arms (e.g., wheelchair, bedside commode, etc.): 3  Moving from lying on back to sitting on the side of the bed?: 3  Moving to and from a bed to a chair (including a wheelchair)?: 3  Need to walk in hospital room?: 3  Climbing 3-5 steps with a railing?: 2  Total Score: 17       Therapeutic Activities and Exercises:   SpO2 on 1/5L/min 95% with light activity.  Pt performed sitting therapeutic exercises including hip flexion, long arc quads, hip add/abd, glute sets, ankle pumps x 10 reps with verbal and visual cues.       Patient left up in chair with all lines intact, call button in reach, nurse notified and daughter present..    GOALS:    Physical Therapy Goals        Problem: Physical Therapy Goal    Goal Priority Disciplines Outcome Goal Variances Interventions   Physical Therapy Goal     PT/OT, PT Ongoing (interventions implemented as appropriate)     Description:  Goals to be met by: 1/10/18     Patient will increase functional independence with mobility by performin. Sit<>stand transfer with supervision using rolling walker.   2. Gait > 50 feet with supervision using rolling walker.   3. Ascend/descend full flight stairs with unilateral handrails with CGA with or without AD.  4. Activity > 5 minutes with SpO2 > 90% on room air.                  Multidisciplinary Problems (Resolved)        Problem: Physical Therapy Goal    Goal Priority Disciplines Outcome Goal Variances Interventions   Physical Therapy Goal   (Resolved)     PT/OT, PT  Error                     Time Tracking:     PT Received On: 18  PT Start Time: 1516     PT Stop Time: 1541  PT Total Time (min): 25 min     Billable Minutes:  Therapeutic Activity 15 and Therapeutic Exercise 10    Treatment Type: Treatment  PT/PTA: PT     PTA Visit Number: 0     Luz Marina Robbins, PT  01/04/2018

## 2018-01-04 NOTE — PLAN OF CARE
Luz (daughter) - [phone]  SON(  POA )DAVID # [phone] - HE OK IF YOU CALL SISTER LUZ FOR DC PLANNING ( SISTER LIVES IN [facility] )   -----------------------------  ATTN: TEAM DC PLANNING     PLAN A : SNF- CHOICES   [facility] - pending acceptance   [facility]   [facility]     [facility]ED[facility]ET - PENDING CALL BACK     NEEDS PSARR , TB SKIN TEST , LAST BM DOC , IMMUNIZATIONS RECORD PER PRIMARY RN ION EPIC      PLAN B: RETURN TO DAUGHTER HOME [address] [place] [address]- SHE / LIZBETH STATED NO HEAT@ HER HOUSE NOW  - IFO GIVEN ABOUT  BECKIE MARTINEZ ON SITE # [phone]- TO DAUGHTER      IF ANY NEED ARISE PLEASE CONTACT RNCM / SSW TEAM    NIRAJ ALVARADO ON CASE ALSO # [id number]     Norma Dominguez RN  Case management   # [phone] (FAX) [phone]     01/04/18 1112   Discharge Assessment   Assessment Type Discharge Planning Reassessment

## 2018-01-04 NOTE — PLAN OF CARE
Problem: Patient Care Overview  Goal: Plan of Care Review  Pt currently on 1lpm  . Pt in no distress at this time. Sats  97 %. Will continue to monitor.

## 2018-01-05 VITALS
WEIGHT: 140.44 LBS | TEMPERATURE: 98 F | OXYGEN SATURATION: 94 % | BODY MASS INDEX: 27.57 KG/M2 | RESPIRATION RATE: 18 BRPM | SYSTOLIC BLOOD PRESSURE: 124 MMHG | DIASTOLIC BLOOD PRESSURE: 54 MMHG | HEART RATE: 75 BPM | HEIGHT: 60 IN

## 2018-01-05 LAB
ANION GAP SERPL CALC-SCNC: 7 MMOL/L
BUN SERPL-MCNC: 19 MG/DL
CALCIUM SERPL-MCNC: 8.2 MG/DL
CHLORIDE SERPL-SCNC: 95 MMOL/L
CO2 SERPL-SCNC: 25 MMOL/L
CREAT SERPL-MCNC: 0.8 MG/DL
EST. GFR  (AFRICAN AMERICAN): >60 ML/MIN/1.73 M^2
EST. GFR  (NON AFRICAN AMERICAN): >60 ML/MIN/1.73 M^2
GLUCOSE SERPL-MCNC: 80 MG/DL
POTASSIUM SERPL-SCNC: 3.9 MMOL/L
SODIUM SERPL-SCNC: 127 MMOL/L
TB INDURATION 48 - 72 HR READ: 0 MM

## 2018-01-05 PROCEDURE — 94761 N-INVAS EAR/PLS OXIMETRY MLT: CPT

## 2018-01-05 PROCEDURE — 36415 COLL VENOUS BLD VENIPUNCTURE: CPT

## 2018-01-05 PROCEDURE — 99238 HOSP IP/OBS DSCHRG MGMT 30/<: CPT | Mod: ,,, | Performed by: HOSPITALIST

## 2018-01-05 PROCEDURE — 25000003 PHARM REV CODE 250: Performed by: HOSPITALIST

## 2018-01-05 PROCEDURE — 63600175 PHARM REV CODE 636 W HCPCS: Performed by: HOSPITALIST

## 2018-01-05 PROCEDURE — 94664 DEMO&/EVAL PT USE INHALER: CPT

## 2018-01-05 PROCEDURE — 99900035 HC TECH TIME PER 15 MIN (STAT)

## 2018-01-05 PROCEDURE — 80048 BASIC METABOLIC PNL TOTAL CA: CPT

## 2018-01-05 PROCEDURE — 97116 GAIT TRAINING THERAPY: CPT

## 2018-01-05 PROCEDURE — 97530 THERAPEUTIC ACTIVITIES: CPT

## 2018-01-05 PROCEDURE — 94640 AIRWAY INHALATION TREATMENT: CPT

## 2018-01-05 RX ADMIN — DOXYCYCLINE HYCLATE 100 MG: 100 TABLET, COATED ORAL at 10:01

## 2018-01-05 RX ADMIN — PREDNISONE 20 MG: 20 TABLET ORAL at 10:01

## 2018-01-05 NOTE — NURSING
Report called to Celia at [facility].   within the hour.

## 2018-01-05 NOTE — SUBJECTIVE & OBJECTIVE
Interval History:  She tends to be wakeful in the morning and gets gradually more sleepy throughout the day.  Seems associated with BP medication as BP is up in the AM and when she gets her meds and BP drops below normal her energy level flags.  Discussed discontinuation of meds with her daughter.  Patient accepted to SNF for tomorrow AM.    Review of Systems   Constitutional: Positive for fatigue. Negative for chills and fever.   Respiratory: Positive for cough and shortness of breath.    Cardiovascular: Negative for chest pain and palpitations.     Objective:     Vital Signs (Most Recent):  Temp: 98.3 °F (36.8 °C) (01/04/18 1733)  Pulse: 74 (01/04/18 1733)  Resp: 18 (01/04/18 1733)  BP: (!) 104/55 (01/04/18 1733)  SpO2: 96 % (01/04/18 1733) Vital Signs (24h Range):  Temp:  [98.3 °F (36.8 °C)-98.5 °F (36.9 °C)] 98.3 °F (36.8 °C)  Pulse:  [67-74] 74  Resp:  [18-20] 18  SpO2:  [94 %-98 %] 96 %  BP: ()/(47-63) 104/55     Weight: 63.7 kg (140 lb 6.9 oz)  Body mass index is 27.43 kg/m².    Intake/Output Summary (Last 24 hours) at 01/04/18 2006  Last data filed at 01/04/18 0400   Gross per 24 hour   Intake                0 ml   Output              250 ml   Net             -250 ml      Physical Exam   Constitutional: She is oriented to person, place, and time. She appears well-developed and well-nourished.   Frail, elderly woman sitting up in a chair.   HENT:   Head: Normocephalic.   Eyes: Conjunctivae are normal. Pupils are equal, round, and reactive to light.   Neck: Neck supple. No thyromegaly present.   Cardiovascular: Normal rate, regular rhythm and intact distal pulses.  Exam reveals no gallop and no friction rub.    Murmur heard.  Pulmonary/Chest: Effort normal. She has no wheezes. She has no rales.   Occasional cough.   Abdominal: Soft. Bowel sounds are normal. She exhibits no distension. There is no tenderness.   Musculoskeletal: Normal range of motion. She exhibits no edema.   Lymphadenopathy:     She has  no cervical adenopathy.   Neurological: She is alert and oriented to person, place, and time.   Skin: Skin is warm and dry. No rash noted.   Psychiatric: She has a normal mood and affect. Her behavior is normal.       Significant Labs: All pertinent labs within the past 24 hours have been reviewed.    Significant Imaging: I have reviewed all pertinent imaging results/findings within the past 24 hours.

## 2018-01-05 NOTE — PT/OT/SLP PROGRESS
Physical Therapy Treatment    Patient Name:  Radha Connell   MRN:  [id number]    Recommendations:     Discharge Recommendations:  nursing facility, skilled   Discharge Equipment Recommendations: walker, rolling   Barriers to discharge: Decreased caregiver support    Assessment:     Radha Connell is a 89 y.o. female admitted with a medical diagnosis of Community acquired pneumonia.  She presents with the following impairments/functional limitations:  weakness, impaired endurance, gait instability, impaired functional mobilty, impaired self care skills, impaired balance, decreased safety awareness patient increase with gait distance on this day. Patient was motivated and tolerated treatment session fairly well and will cont. To benefit from skilled PT services to increase strength, endurance and functional mobility.     Rehab Prognosis:  good; patient would benefit from acute skilled PT services to address these deficits and reach maximum level of function.      Recent Surgery: * No surgery found *      Plan:     During this hospitalization, patient to be seen 6 x/week to address the above listed problems via gait training, therapeutic activities, therapeutic exercises, neuromuscular re-education  · Plan of Care Expires:  01/29/18   Plan of Care Reviewed with: patient    Subjective     Communicated with nurse prior to session.  Patient found supine in bed upon PT entry to room, agreeable to treatment.      Chief Complaint: Patient reported I'm weak because I haven't moved much  Patient comments/goals: get back to [place]  Pain/Comfort:  · Pain Rating 1: 0/10  · Pain Rating Post-Intervention 1: 0/10    Patients cultural, spiritual, Orthodox conflicts given the current situation: none specified    Objective:     Patient found with: oxygen (2L)     General Precautions: Standard,  (DNR, Fall risk, aspiration)   Orthopedic Precautions:N/A   Braces: N/A     Functional Mobility:  · Supine to sit with HOB flat with SBA and use  of hand rail required verbal cues and increase time  · Sit to stand from bed, bedside commode, bedside chair X 2 trials with Rolling walker with CGA for safety and required verbal cues for hand placement.   · Patient gait trained 5 feet, 14 feet, 17 feet with rolling walker with CGA for safety and balance. Patient demo slow roxie, reciprocal gait pattern. Followed with bedside chair for safety. Required a seated rest break. Oxygen saturation ranged between 98-90% on RA. Nurse was present and reported patient was good to stay off the oxygen.     AM-PAC 6 CLICK MOBILITY  Turning over in bed (including adjusting bedclothes, sheets and blankets)?: 3  Sitting down on and standing up from a chair with arms (e.g., wheelchair, bedside commode, etc.): 3  Moving from lying on back to sitting on the side of the bed?: 3  Moving to and from a bed to a chair (including a wheelchair)?: 3  Need to walk in hospital room?: 3  Climbing 3-5 steps with a railing?: 2  Total Score: 17     Therapeutic Activities and Exercises:  Patient required CGA for balance while self cleaning after using bedside commode.   Patient required assistance to scott/doff underwear; CGA for standing balance.     Patient left up in chair with all lines intact, call button in reach and nurse notified..    GOALS:    Physical Therapy Goals        Problem: Physical Therapy Goal    Goal Priority Disciplines Outcome Goal Variances Interventions   Physical Therapy Goal     PT/OT, PT Ongoing (interventions implemented as appropriate)     Description:  Goals to be met by: 1/10/18     Patient will increase functional independence with mobility by performin. Sit<>stand transfer with supervision using rolling walker.   2. Gait > 50 feet with supervision using rolling walker.   3. Ascend/descend full flight stairs with unilateral handrails with CGA with or without AD.  4. Activity > 5 minutes with SpO2 > 90% on room air.               Problem: Physical Therapy Goal     Goal Priority Disciplines Outcome Goal Variances Interventions   Physical Therapy Goal     PT/OT, PT Ongoing (interventions implemented as appropriate)                     Time Tracking:     PT Received On: 01/05/18  PT Start Time: 1010     PT Stop Time: 1105  PT Total Time (min): 55 min     Billable Minutes: Gait Training 20 and Therapeutic Activity 35    Treatment Type: Treatment  PT/PTA: PTA     PTA Visit Number: 1     Katya Kee, PTA  01/05/2018

## 2018-01-05 NOTE — PLAN OF CARE
Problem: Patient Care Overview  Goal: Plan of Care Review  Outcome: Ongoing (interventions implemented as appropriate)  Pt on NC 1.5L sat' 98% prn tx not needed,acaella done will continue to monitor.

## 2018-01-05 NOTE — PROGRESS NOTES
[facility] Hospital Medicine  Progress Note    Patient Name: Radha Connell  MRN: [id number]  Patient Class: IP- Inpatient   Admission Date: 12/29/2017  Length of Stay: 6 days  Attending Physician: Charlotte Perez MD  Primary Care Provider: Ivelisse Cerrato MD        Subjective:     Principal Problem:Community acquired pneumonia    HPI:  Ms. Connell is a 89 year old woman who presented to the ED with wheezing, shortness of breath and cough for one day.  Family reported she had been anorexic, tired and weak for the preceding 3 days.  She did not have fever, chills, GI or urinary tract symptoms.      Patient's medical history is notable for HTN and dementia.  She does not have a history of lung disease and is a lifelong non-smoker.  She is a patient of Dr. Cerrato but most recent office note is from 2014.       ED evaluation showed WBC 15.94, Na 128; CXR showed bibasilar opacities    Admitted for further evaluation and treatment.    Hospital Course:  Patient was admitted on IV antibiotics for presumed CAP.  Her wheezing and overall condition improved, but she continued to have episodes of respiratory distress associated with panicking, eventually was transferred to the ICU on 1/1 due to one of these episodes.  She was seen by Speech therapy for possible aspiration, and a modified barium swallow study was recommended initially but she actually passed a bedside swallow study when she was feeling better.  SNF was recommended.    Interval History:  She tends to be wakeful in the morning and gets gradually more sleepy throughout the day.  Seems associated with BP medication as BP is up in the AM and when she gets her meds and BP drops below normal her energy level flags.  Discussed discontinuation of meds with her daughter.  Patient accepted to SNF for tomorrow AM.    Review of Systems   Constitutional: Positive for fatigue. Negative for chills and fever.   Respiratory: Positive for cough and shortness of  breath.    Cardiovascular: Negative for chest pain and palpitations.     Objective:     Vital Signs (Most Recent):  Temp: 98.3 °F (36.8 °C) (01/04/18 1733)  Pulse: 74 (01/04/18 1733)  Resp: 18 (01/04/18 1733)  BP: (!) 104/55 (01/04/18 1733)  SpO2: 96 % (01/04/18 1733) Vital Signs (24h Range):  Temp:  [98.3 °F (36.8 °C)-98.5 °F (36.9 °C)] 98.3 °F (36.8 °C)  Pulse:  [67-74] 74  Resp:  [18-20] 18  SpO2:  [94 %-98 %] 96 %  BP: ()/(47-63) 104/55     Weight: 63.7 kg (140 lb 6.9 oz)  Body mass index is 27.43 kg/m².    Intake/Output Summary (Last 24 hours) at 01/04/18 2006  Last data filed at 01/04/18 0400   Gross per 24 hour   Intake                0 ml   Output              250 ml   Net             -250 ml      Physical Exam   Constitutional: She is oriented to person, place, and time. She appears well-developed and well-nourished.   Frail, elderly woman sitting up in a chair.   HENT:   Head: Normocephalic.   Eyes: Conjunctivae are normal. Pupils are equal, round, and reactive to light.   Neck: Neck supple. No thyromegaly present.   Cardiovascular: Normal rate, regular rhythm and intact distal pulses.  Exam reveals no gallop and no friction rub.    Murmur heard.  Pulmonary/Chest: Effort normal. She has no wheezes. She has no rales.   Occasional cough.   Abdominal: Soft. Bowel sounds are normal. She exhibits no distension. There is no tenderness.   Musculoskeletal: Normal range of motion. She exhibits no edema.   Lymphadenopathy:     She has no cervical adenopathy.   Neurological: She is alert and oriented to person, place, and time.   Skin: Skin is warm and dry. No rash noted.   Psychiatric: She has a normal mood and affect. Her behavior is normal.       Significant Labs: All pertinent labs within the past 24 hours have been reviewed.    Significant Imaging: I have reviewed all pertinent imaging results/findings within the past 24 hours.    Assessment/Plan:      * Community acquired pneumonia    - Leukocytosis with  bibasilar opacities on CXR on admission  - Completed 5 day course of azithromycin.  Ceftriaxone changed to oral doxycycline given PCN allergy.  - Blood and sputum cultures show no growth to date.   - Continue bronchodilators and a capella device.            Anemia, macrocytic    - Worked up in the past and cause not found  - Normal iron, B12 and folic acid.            Thrombocytopenia    - no evidence of bleeding  - monitor          Hyponatremia    - History of chronic hyponatremia, could be related to PNA vs volume depletion from poor oral intake  - IV fluids discontinued  - Urine sodium appropriately low              Essential hypertension    - Continue current medication and monitor for hypotension.          VTE Risk Mitigation         Ordered     enoxaparin injection 30 mg  Daily     Route:  Subcutaneous        12/30/17 1428     Medium Risk of VTE  Once      12/29/17 1344              Charlotte Chacko MD  Department of Hospital Medicine   [facility]

## 2018-01-05 NOTE — PLAN OF CARE
Problem: Physical Therapy Goal  Goal: Physical Therapy Goal  Outcome: Ongoing (interventions implemented as appropriate)    Patient improved with mobility and increase with gait distance. Patient required CGA/Min A for mobility.

## 2018-01-05 NOTE — PLAN OF CARE
Problem: Patient Care Overview  Goal: Plan of Care Review  Outcome: Ongoing (interventions implemented as appropriate)  No change in respiratory status, no PRN treatment needed at this time, will continue to monitor.

## 2018-01-05 NOTE — PLAN OF CARE
Problem: Patient Care Overview  Goal: Plan of Care Review  Outcome: Ongoing (interventions implemented as appropriate)   01/05/18 0631   Coping/Psychosocial   Plan Of Care Reviewed With patient       Comments: Plan of care reviewed with patient and family. Patient alert to person only. Family updated at bedside and via phone. V/S stable and patient resting peacefully. Reports no pain. With bed alarm on. Room near unit station. Patient remains free of harm.    Elsa Chen RN  1/5/2018  6:34 AM

## 2018-01-05 NOTE — PLAN OF CARE
Luz (daughter) - [phone]  SON(  POA )DAVID #  - HE OK IF YOU CALL SISTER LUZ FOR DC PLANNING ( SISTER LIVES IN [facility] )   -----------------------------  ATTN: TEAM DC PLANNING    PLAN  : SNF-     [facility] SNF -Accepted PER [facility] -   PER REP@ [facility]'S UNABLE TO ACCEPT PT UNTIL AFTER 3PM    PER SNF  RN TO CALL REPORT @ 3PM AND TO SEND PT @ 330 PM     NURSE REPORT  [phone]  ASK FOR 4TH FLOOR NURSE DELANEY / RADHA     SPD -PICK  PM MARICRUZ BRICE     DAUGHTER @ [facility] DOING  PAPER WORK -     IF ANY NEED ARISE PLEASE CONTACT RNCM / SSW TEAM    NIRAJ ALVARADO ON CASE ALSO # [id number]     Norma Dominguez, MICHELLE  Case management   # [phone] (FAX) [phone]     01/05/18 1344   Final Note   Assessment Type Final Discharge Note   Discharge Disposition SNF   Hospital Follow Up  Appt(s) scheduled? Yes   Discharge plans and expectations educations in teach back method with documentation complete? Yes   Right Care Referral Info   Post Acute Recommendation SNF / Sub-Acute Rehab

## 2018-01-05 NOTE — PLAN OF CARE
Luz (daughter) - [phone]  SON(  POA )DAVID #  - HE OK IF YOU CALL SISTER LUZ FOR DC PLANNING ( SISTER LIVES IN [facility] )   -----------------------------  ATTN: TEAM DC PLANNING    PLAN  : SNF-     [facility] SNF -Accepted PER [facility] - PENDING  FINAL OK PER [facility] TO SEND PATIENT     DAUGHTER @ [facility] DOING  PAPER WORK -     IF ANY NEED ARISE PLEASE CONTACT RNCM / SSW TEAM    NIRAJ ALVARADO ON CASE ALSO # [id number]     Norma Dominguez RN  Case management   # [phone] (FAX) [phone]     01/05/18 1155   Final Note   Assessment Type Final Discharge Note   Discharge Disposition Home   Hospital Follow Up  Appt(s) scheduled? Yes   Discharge plans and expectations educations in teach back method with documentation complete? Yes   Right Care Referral Info   Post Acute Recommendation No Care

## 2018-01-06 NOTE — DISCHARGE SUMMARY
[facility] Hospital Medicine  Discharge Summary      Patient Name: Radha Connell  MRN: [id number]  Admission Date: 12/29/2017  Hospital Length of Stay: 7 days  Discharge Date and Time: 1/5/2018  3:36 PM  Attending Physician: No att. providers found   Discharging Provider: Charlotte Chacko MD  Primary Care Provider: Ivelisse Cerrato MD      HPI:   Ms. Connell is a 89 year old woman who presented to the ED with wheezing, shortness of breath and cough for one day.  Family reported she had been anorexic, tired and weak for the preceding 3 days.  She did not have fever, chills, GI or urinary tract symptoms.      Patient's medical history is notable for HTN and dementia.  She does not have a history of lung disease and is a lifelong non-smoker.  She is a patient of Dr. Cerrato but most recent office note is from 2014.       ED evaluation showed WBC 15.94, Na 128; CXR showed bibasilar opacities    Admitted for further evaluation and treatment.          Hospital Course:   Patient was admitted on IV antibiotics for presumed CAP.  Her wheezing and overall condition improved, but she continued to have episodes of respiratory distress associated with panicking, eventually was transferred to the ICU on 1/1 due to one of these episodes.  She was seen by Speech therapy for possible aspiration, and a modified barium swallow study was recommended initially but she actually passed a bedside swallow study when she was feeling better.  SNF was recommended.  At discharge she was oriented, tolerating a diet and able to work with therapy, although remained quite debilitated.     Consults:   Consults         Status Ordering Provider     Inpatient consult to Pulmonary Critical Care  Once     Provider:  Senia Blanco MD    Completed RACHELL DHILLON            Final Active Diagnoses:    Diagnosis Date Noted POA    PRINCIPAL PROBLEM:  Community acquired pneumonia [J18.9] 12/29/2017 Yes    Hyponatremia [E87.1] 12/29/2017 Yes     Anemia, macrocytic [D53.9] 12/29/2017 Yes    Essential hypertension [I10]  Yes      Problems Resolved During this Admission:    Diagnosis Date Noted Date Resolved POA    Respiratory distress [R06.00] 12/31/2017 01/03/2018 Yes    Acute cystitis without hematuria [N30.00] 12/30/2017 01/04/2018 Yes       Discharged Condition: stable    Disposition: Skilled Nursing Facility    Follow Up:  Follow-up Information     Ivelisse Cerrato MD.    Specialty:  Internal Medicine  Why:  Follow up for the next available appointment after discharge from SNF  Contact information:  [address]  [facility] [address]  [phone]                 Patient Instructions:     Diet Dental Soft     Activity as tolerated       Medications:  Reconciled Home Medications:   Discharge Medication List as of 1/5/2018  3:37 PM      START taking these medications    Details   cholecalciferol, vitamin D3, 1,000 unit capsule Take 1 capsule (1,000 Units total) by mouth once daily., Starting Thu 1/4/2018, OTC      doxycycline (VIBRA-TABS) 100 MG tablet Take 1 tablet (100 mg total) by mouth every 12 (twelve) hours., Starting Thu 1/4/2018, Until Thu 1/11/2018, No Print         STOP taking these medications       NON FORMULARY MEDICATION Comments:   Reason for Stopping:         trandolapril (MAVIK) 2 MG Tab Comments:   Reason for Stopping:         verapamil (CALAN-SR) 240 MG CR tablet Comments:   Reason for Stopping:         alendronate (FOSAMAX) 70 MG tablet Comments:   Reason for Stopping:             Time spent on the discharge of patient: >30 minutes  Patient was seen and examined on the date of discharge and determined to be suitable for discharge.         Charlotte Chacko MD  Department of Hospital Medicine  [facility]

## 2018-01-06 NOTE — PROGRESS NOTES
Physical Therapy Discharge Summary    Name: Radha Connell  MRN: [id number]   Principal Problem: Community acquired pneumonia     Patient Discharged from acute Physical Therapy on 18.  Please refer to prior PT noted date on 18 for functional status.     Assessment:     Patient appropriate for care in another setting.    Objective:     GOALS:    Physical Therapy Goals        Problem: Physical Therapy Goal    Goal Priority Disciplines Outcome Goal Variances Interventions   Physical Therapy Goal     PT/OT, PT Ongoing (interventions implemented as appropriate)     Description:  Goals to be met by: 1/10/18     Patient will increase functional independence with mobility by performin. Sit<>stand transfer with supervision using rolling walker.   2. Gait > 50 feet with supervision using rolling walker.   3. Ascend/descend full flight stairs with unilateral handrails with CGA with or without AD.  4. Activity > 5 minutes with SpO2 > 90% on room air.               Problem: Physical Therapy Goal    Goal Priority Disciplines Outcome Goal Variances Interventions   Physical Therapy Goal     PT/OT, PT Ongoing (interventions implemented as appropriate)                     Reasons for Discontinuation of Therapy Services  Transfer to alternate level of care.      Plan:     Patient Discharged to: Skilled Nursing Facility.    Kiran Asencio, PT  2018

## 2018-01-07 NOTE — PT/OT/SLP DISCHARGE
Occupational Therapy Discharge Summary    Radha Connell  MRN: [id number]   Principal Problem: Community acquired pneumonia      Patient Discharged from acute Occupational Therapy on 1/5/18.  Please refer to prior OT note dated 1/4/18 for functional status.    Assessment:      Patient appropriate for care in another setting.    Objective:     GOALS:    Occupational Therapy Goals        Problem: Occupational Therapy Goal    Goal Priority Disciplines Outcome Interventions   Occupational Therapy Goal     OT, PT/OT Ongoing (interventions implemented as appropriate)    Description:  Goals to be met by: 2/2/2018     Patient will increase functional independence with ADLs by performing:    UE Dressing with Set-up Assistance.  LE Dressing with Contact Guard Assistance.  Grooming while standing at sink with Stand-by Assistance.  Toileting from bedside commode with Stand-by Assistance for hygiene and clothing management.   Stand pivot transfers with Stand-by Assistance.  Toilet transfer to bedside commode with Stand-by Assistance.  Pt will demonstrate improved endurance as evidence of completing G/H from standing position with no seated rest break and no SOB.   Functional mobility at house-hold level in prep for ADLs using RW with SBA.                       Reasons for Discontinuation of Therapy Services  Transfer to alternate level of care.      Plan:     Patient Discharged to: Skilled Nursing Facility    PARIS Martinez  1/7/2018

## 2018-01-08 ENCOUNTER — PATIENT OUTREACH (OUTPATIENT)
Dept: ADMINISTRATIVE | Facility: CLINIC | Age: 83
End: 2018-01-08

## 2019-12-29 NOTE — PROGRESS NOTES
RN observed daughter giving patient sips of water from a cup. RN explained to family that the patient is at an increased risk of aspiration. Daughter verbalized understanding but continued to give patient water from a cup. Patient tolerated drinking from a cup.   WDL

## 2021-08-03 NOTE — PLAN OF CARE
Problem: Physical Therapy Goal  Goal: Physical Therapy Goal  Goals to be met by: 1/10/18     Patient will increase functional independence with mobility by performin. Sit<>stand transfer with supervision using rolling walker.   2. Gait > 150 feet with supervision using rolling walker.   3. Ascend/descend full flight stairs with unilateral handrails with CGA with or without AD.    Outcome: Ongoing (interventions implemented as appropriate)  PT evaluation completed. Pt requires CGA for gait in halls > 200 ft with rolling walker. SpO2 on room air 97% at rest, 93% with activity, recovery to 97% in sitting. Pt ok for OOB to chair, bathroom, and ambulation in halls with nurse assist and use of walker. Will continue to follow and progress as tolerated. Please see progress note for detailed plan of care and recommendations (return to assisted living in [place] with home health versus in-house PT).         yes
